# Patient Record
Sex: MALE | Race: WHITE | Employment: OTHER
[De-identification: names, ages, dates, MRNs, and addresses within clinical notes are randomized per-mention and may not be internally consistent; named-entity substitution may affect disease eponyms.]

---

## 2017-01-06 ENCOUNTER — TELEPHONE (OUTPATIENT)
Dept: CASE MANAGEMENT | Age: 57
End: 2017-01-06

## 2018-01-05 ENCOUNTER — TELEPHONE (OUTPATIENT)
Dept: CASE MANAGEMENT | Age: 58
End: 2018-01-05

## 2018-01-05 NOTE — LETTER
Program.   If you have any additional questions or concerns regarding our CT Lung Screening Program or our Incidental Pulmonary Nodule Program, please don't hesitate to call me at [phone]. Pulmonary Nodule Program  [address]:  [phone]  F:  [phone]                                           CT Lung Screening/ Lung Nodule Program  [address] [facility]. [place], [address]       TO:   Moncho Jade MD    FAX:   [phone]        FROM:   [place], [place] Lung Screening Program    DATE:   1/5/2018    PHONE:  [phone]    FAX:    [phone]      Comments:  Annual CT Lung Screening Reminder     RE:  Anisa Erickson  1960    Thank you for your referral to our [address].  If you have any questions please call [phone] or fax [phone]. The information contained in this fax message is intended only for Personal and Confidential use of the designated recipient(s) names above. This information is to be handled as Privileged and Confidential. If the reader of this message is not the intended recipient, you are hereby notified that you have received this document in error, and that any review, dissemination, distribution, or copying of this message is strictly prohibited. If you receive this communication in error, please notify us immediately at the above number and return the original message to us by mail. Thank you. Member of [employer].

## 2019-03-06 ENCOUNTER — OFFICE VISIT (OUTPATIENT)
Dept: FAMILY MEDICINE CLINIC | Age: 59
End: 2019-03-06
Payer: COMMERCIAL

## 2019-03-06 VITALS
WEIGHT: 223 LBS | DIASTOLIC BLOOD PRESSURE: 84 MMHG | SYSTOLIC BLOOD PRESSURE: 126 MMHG | RESPIRATION RATE: 14 BRPM | BODY MASS INDEX: 32.93 KG/M2 | HEART RATE: 90 BPM | OXYGEN SATURATION: 97 %

## 2019-03-06 DIAGNOSIS — M25.511 CHRONIC RIGHT SHOULDER PAIN: Primary | ICD-10-CM

## 2019-03-06 DIAGNOSIS — G89.29 CHRONIC RIGHT SHOULDER PAIN: Primary | ICD-10-CM

## 2019-03-06 PROCEDURE — 20610 DRAIN/INJ JOINT/BURSA W/O US: CPT | Performed by: FAMILY MEDICINE

## 2019-03-06 PROCEDURE — 99213 OFFICE O/P EST LOW 20 MIN: CPT | Performed by: FAMILY MEDICINE

## 2019-03-06 RX ORDER — LIDOCAINE HYDROCHLORIDE 10 MG/ML
3 INJECTION, SOLUTION INFILTRATION; PERINEURAL ONCE
Status: DISCONTINUED | OUTPATIENT
Start: 2019-03-06 | End: 2022-08-15 | Stop reason: CLARIF

## 2019-03-06 RX ORDER — TRIAMCINOLONE ACETONIDE 40 MG/ML
40 INJECTION, SUSPENSION INTRA-ARTICULAR; INTRAMUSCULAR ONCE
Status: COMPLETED | OUTPATIENT
Start: 2019-03-06 | End: 2019-03-06

## 2019-03-06 RX ADMIN — TRIAMCINOLONE ACETONIDE 40 MG: 40 INJECTION, SUSPENSION INTRA-ARTICULAR; INTRAMUSCULAR at 15:52

## 2019-03-06 ASSESSMENT — PATIENT HEALTH QUESTIONNAIRE - PHQ9
SUM OF ALL RESPONSES TO PHQ9 QUESTIONS 1 & 2: 0
1. LITTLE INTEREST OR PLEASURE IN DOING THINGS: 0
2. FEELING DOWN, DEPRESSED OR HOPELESS: 0
SUM OF ALL RESPONSES TO PHQ QUESTIONS 1-9: 0
SUM OF ALL RESPONSES TO PHQ QUESTIONS 1-9: 0

## 2019-03-16 ASSESSMENT — ENCOUNTER SYMPTOMS
CHEST TIGHTNESS: 0
BLOOD IN STOOL: 0
ABDOMINAL PAIN: 0
VOMITING: 0
CONSTIPATION: 0
DIARRHEA: 0
SHORTNESS OF BREATH: 0
COLOR CHANGE: 0
COUGH: 0
WHEEZING: 0

## 2021-03-14 ENCOUNTER — IMMUNIZATION (OUTPATIENT)
Dept: PRIMARY CARE CLINIC | Age: 61
End: 2021-03-14
Payer: MEDICAID

## 2021-03-14 PROCEDURE — 0001A COVID-19, PFIZER VACCINE 30MCG/0.3ML DOSE: CPT | Performed by: NURSE PRACTITIONER

## 2021-03-14 PROCEDURE — 91300 COVID-19, PFIZER VACCINE 30MCG/0.3ML DOSE: CPT | Performed by: NURSE PRACTITIONER

## 2021-04-12 ENCOUNTER — IMMUNIZATION (OUTPATIENT)
Dept: PRIMARY CARE CLINIC | Age: 61
End: 2021-04-12
Payer: MEDICAID

## 2021-04-12 PROCEDURE — 91300 COVID-19, PFIZER VACCINE 30MCG/0.3ML DOSE: CPT | Performed by: NURSE PRACTITIONER

## 2021-04-12 PROCEDURE — 0002A COVID-19, PFIZER VACCINE 30MCG/0.3ML DOSE: CPT | Performed by: NURSE PRACTITIONER

## 2021-08-31 ENCOUNTER — OFFICE VISIT (OUTPATIENT)
Dept: PRIMARY CARE CLINIC | Age: 61
End: 2021-08-31
Payer: MEDICAID

## 2021-08-31 VITALS
BODY MASS INDEX: 31.1 KG/M2 | TEMPERATURE: 97.5 F | SYSTOLIC BLOOD PRESSURE: 132 MMHG | WEIGHT: 210 LBS | OXYGEN SATURATION: 99 % | DIASTOLIC BLOOD PRESSURE: 84 MMHG | HEIGHT: 69 IN | HEART RATE: 71 BPM

## 2021-08-31 DIAGNOSIS — Z00.00 PREVENTATIVE HEALTH CARE: ICD-10-CM

## 2021-08-31 DIAGNOSIS — Z91.89 RISK FOR CORONARY ARTERY DISEASE BETWEEN 10% AND 20% IN NEXT 10 YEARS: ICD-10-CM

## 2021-08-31 DIAGNOSIS — F43.21 ADJUSTMENT DISORDER WITH DEPRESSED MOOD: Primary | ICD-10-CM

## 2021-08-31 DIAGNOSIS — M25.511 CHRONIC PAIN OF BOTH SHOULDERS: ICD-10-CM

## 2021-08-31 DIAGNOSIS — M25.512 CHRONIC PAIN OF BOTH SHOULDERS: ICD-10-CM

## 2021-08-31 DIAGNOSIS — Z12.5 SCREENING FOR MALIGNANT NEOPLASM OF PROSTATE: ICD-10-CM

## 2021-08-31 DIAGNOSIS — G89.29 CHRONIC PAIN OF BOTH SHOULDERS: ICD-10-CM

## 2021-08-31 LAB
ALBUMIN SERPL-MCNC: 4.5 G/DL (ref 3.5–5.2)
ALP BLD-CCNC: 70 U/L (ref 40–129)
ALT SERPL-CCNC: 28 U/L (ref 0–40)
ANION GAP SERPL CALCULATED.3IONS-SCNC: 11 MMOL/L (ref 7–16)
AST SERPL-CCNC: 22 U/L (ref 0–39)
BILIRUB SERPL-MCNC: 0.9 MG/DL (ref 0–1.2)
BUN BLDV-MCNC: 11 MG/DL (ref 6–23)
CALCIUM SERPL-MCNC: 9.2 MG/DL (ref 8.6–10.2)
CHLORIDE BLD-SCNC: 102 MMOL/L (ref 98–107)
CHOLESTEROL, TOTAL: 272 MG/DL (ref 0–199)
CO2: 25 MMOL/L (ref 22–29)
CREAT SERPL-MCNC: 0.8 MG/DL (ref 0.7–1.2)
GFR AFRICAN AMERICAN: >60
GFR NON-AFRICAN AMERICAN: >60 ML/MIN/1.73
GLUCOSE BLD-MCNC: 140 MG/DL (ref 74–99)
HBA1C MFR BLD: 5.6 % (ref 4–5.6)
HCT VFR BLD CALC: 49.1 % (ref 37–54)
HDLC SERPL-MCNC: 40 MG/DL
HEMOGLOBIN: 16.6 G/DL (ref 12.5–16.5)
LDL CHOLESTEROL CALCULATED: 186 MG/DL (ref 0–99)
MCH RBC QN AUTO: 29.3 PG (ref 26–35)
MCHC RBC AUTO-ENTMCNC: 33.8 % (ref 32–34.5)
MCV RBC AUTO: 86.7 FL (ref 80–99.9)
PDW BLD-RTO: 13.1 FL (ref 11.5–15)
PLATELET # BLD: 270 E9/L (ref 130–450)
PMV BLD AUTO: 10.9 FL (ref 7–12)
POTASSIUM SERPL-SCNC: 4.6 MMOL/L (ref 3.5–5)
PROSTATE SPECIFIC ANTIGEN: 4.73 NG/ML (ref 0–4)
RBC # BLD: 5.66 E12/L (ref 3.8–5.8)
SODIUM BLD-SCNC: 138 MMOL/L (ref 132–146)
TOTAL PROTEIN: 6.9 G/DL (ref 6.4–8.3)
TRIGL SERPL-MCNC: 231 MG/DL (ref 0–149)
VLDLC SERPL CALC-MCNC: 46 MG/DL
WBC # BLD: 11 E9/L (ref 4.5–11.5)

## 2021-08-31 PROCEDURE — 4004F PT TOBACCO SCREEN RCVD TLK: CPT | Performed by: FAMILY MEDICINE

## 2021-08-31 PROCEDURE — 99214 OFFICE O/P EST MOD 30 MIN: CPT | Performed by: FAMILY MEDICINE

## 2021-08-31 PROCEDURE — G8427 DOCREV CUR MEDS BY ELIG CLIN: HCPCS | Performed by: FAMILY MEDICINE

## 2021-08-31 PROCEDURE — 20610 DRAIN/INJ JOINT/BURSA W/O US: CPT | Performed by: FAMILY MEDICINE

## 2021-08-31 PROCEDURE — 3017F COLORECTAL CA SCREEN DOC REV: CPT | Performed by: FAMILY MEDICINE

## 2021-08-31 PROCEDURE — G8417 CALC BMI ABV UP PARAM F/U: HCPCS | Performed by: FAMILY MEDICINE

## 2021-08-31 RX ORDER — HYDROCODONE BITARTRATE AND ACETAMINOPHEN 5; 325 MG/1; MG/1
1 TABLET ORAL EVERY 6 HOURS PRN
Qty: 28 TABLET | Refills: 0 | Status: SHIPPED | OUTPATIENT
Start: 2021-08-31 | End: 2021-09-07

## 2021-08-31 RX ORDER — SERTRALINE HYDROCHLORIDE 25 MG/1
25 TABLET, FILM COATED ORAL DAILY
Qty: 30 TABLET | Refills: 1 | Status: SHIPPED
Start: 2021-08-31 | End: 2021-09-16 | Stop reason: SINTOL

## 2021-08-31 RX ORDER — MELOXICAM 15 MG/1
15 TABLET ORAL DAILY
Qty: 30 TABLET | Refills: 3 | Status: SHIPPED
Start: 2021-08-31 | End: 2022-08-15 | Stop reason: CLARIF

## 2021-08-31 RX ORDER — TRIAMCINOLONE ACETONIDE 40 MG/ML
40 INJECTION, SUSPENSION INTRA-ARTICULAR; INTRAMUSCULAR ONCE
Status: COMPLETED | OUTPATIENT
Start: 2021-08-31 | End: 2021-08-31

## 2021-08-31 RX ADMIN — TRIAMCINOLONE ACETONIDE 40 MG: 40 INJECTION, SUSPENSION INTRA-ARTICULAR; INTRAMUSCULAR at 10:40

## 2021-08-31 RX ADMIN — TRIAMCINOLONE ACETONIDE 40 MG: 40 INJECTION, SUSPENSION INTRA-ARTICULAR; INTRAMUSCULAR at 10:39

## 2021-08-31 ASSESSMENT — PATIENT HEALTH QUESTIONNAIRE - PHQ9
5. POOR APPETITE OR OVEREATING: 3
SUM OF ALL RESPONSES TO PHQ9 QUESTIONS 1 & 2: 6
3. TROUBLE FALLING OR STAYING ASLEEP: 3
SUM OF ALL RESPONSES TO PHQ QUESTIONS 1-9: 24
SUM OF ALL RESPONSES TO PHQ QUESTIONS 1-9: 27
2. FEELING DOWN, DEPRESSED OR HOPELESS: 3
10. IF YOU CHECKED OFF ANY PROBLEMS, HOW DIFFICULT HAVE THESE PROBLEMS MADE IT FOR YOU TO DO YOUR WORK, TAKE CARE OF THINGS AT HOME, OR GET ALONG WITH OTHER PEOPLE: 2
9. THOUGHTS THAT YOU WOULD BE BETTER OFF DEAD, OR OF HURTING YOURSELF: 3
6. FEELING BAD ABOUT YOURSELF - OR THAT YOU ARE A FAILURE OR HAVE LET YOURSELF OR YOUR FAMILY DOWN: 3
SUM OF ALL RESPONSES TO PHQ QUESTIONS 1-9: 27
7. TROUBLE CONCENTRATING ON THINGS, SUCH AS READING THE NEWSPAPER OR WATCHING TELEVISION: 3
4. FEELING TIRED OR HAVING LITTLE ENERGY: 3
8. MOVING OR SPEAKING SO SLOWLY THAT OTHER PEOPLE COULD HAVE NOTICED. OR THE OPPOSITE, BEING SO FIGETY OR RESTLESS THAT YOU HAVE BEEN MOVING AROUND A LOT MORE THAN USUAL: 3
1. LITTLE INTEREST OR PLEASURE IN DOING THINGS: 3

## 2021-08-31 ASSESSMENT — ENCOUNTER SYMPTOMS
DIARRHEA: 0
CHEST TIGHTNESS: 0
COUGH: 0
SHORTNESS OF BREATH: 0
ABDOMINAL PAIN: 0
CONSTIPATION: 0
VOMITING: 0
WHEEZING: 0
BLOOD IN STOOL: 0
COLOR CHANGE: 0

## 2021-08-31 ASSESSMENT — COLUMBIA-SUICIDE SEVERITY RATING SCALE - C-SSRS
7. DID THIS OCCUR IN THE LAST THREE MONTHS: YES
4. HAVE YOU HAD THESE THOUGHTS AND HAD SOME INTENTION OF ACTING ON THEM?: NO
3. HAVE YOU BEEN THINKING ABOUT HOW YOU MIGHT KILL YOURSELF?: YES
6. HAVE YOU EVER DONE ANYTHING, STARTED TO DO ANYTHING, OR PREPARED TO DO ANYTHING TO END YOUR LIFE?: NO
5. HAVE YOU STARTED TO WORK OUT OR WORKED OUT THE DETAILS OF HOW TO KILL YOURSELF? DO YOU INTEND TO CARRY OUT THIS PLAN?: NO
2. HAVE YOU ACTUALLY HAD ANY THOUGHTS OF KILLING YOURSELF?: YES
1. WITHIN THE PAST MONTH, HAVE YOU WISHED YOU WERE DEAD OR WISHED YOU COULD GO TO SLEEP AND NOT WAKE UP?: YES

## 2021-08-31 NOTE — PROGRESS NOTES
Bret Jarvis  : 1960    Chief Complaint:     Chief Complaint   Patient presents with    Shoulder Pain     west shoulder/joint pain, has had issues for years but fell off roof a few weeks ago    Tailbone Pain     from fall, went to ED in [place]     HPI  Has chronic shoulder pain bilaterally from previous rotator tears. He has completed PT and saw Dr Alannah Hanks for this already, who states the damage is too significant and surgery will be difficult. He's recommended steroid injections for the pain for as long as it will hold him. Last injection was  which lasted for about 1 year for pain, has started to come back and flared significantly from recent fall. Was working on roof/gutters about 3 weeks ago and slipped and fell forward onto his outstretched arms and suzan his shoulders, then slid off onto his tailbone. Went to ED in [place], records received and scanned. Xray showing degenerative cysts disease and chronic rotator damage, but no acute disease. He was discharged with naproxen which is not helping. Also suffering with acute adjustment reaction with primarily depression from his Wife's passing in  from cancer. Acutely depressed, tearful with anhedonia. He is seeing a grief counselor and going to group meetings. Denies suicidal ideation. He is caring for her 2 cats now which likely help. Agreeable to medication as well.      Past medical, surgical, family and social histories reviewed and updated today as appropriate    Health Maintenance Due   Topic Date Due    Hepatitis C screen  Never done    Pneumococcal 0-64 years Vaccine (1 of 2 - PPSV23) Never done    HIV screen  Never done    DTaP/Tdap/Td vaccine (1 - Tdap) Never done    Diabetes screen  Never done    Colon cancer screen colonoscopy  Never done    Shingles Vaccine (1 of 2) Never done    Low dose CT lung screening  2018       Current Outpatient Medications   Medication Sig Dispense Refill    meloxicam (MOBIC) 15 MG tablet Take 1 tablet by mouth daily 30 tablet 3    sertraline (ZOLOFT) 25 MG tablet Take 1 tablet by mouth daily 30 tablet 1    HYDROcodone-acetaminophen (NORCO) 5-325 MG per tablet Take 1 tablet by mouth every 6 hours as needed for Pain for up to 7 days. Take lowest dose possible to manage pain 28 tablet 0    vitamin B-12 (CYANOCOBALAMIN) 100 MCG tablet Take 50 mcg by mouth daily      Cholecalciferol (VITAMIN D3) 1000 UNITS TABS Take 2 tablets by mouth daily 60 tablet 3    aspirin 81 MG tablet Take 81 mg by mouth daily (Patient not taking: Reported on 8/31/2021)       Current Facility-Administered Medications   Medication Dose Route Frequency Provider Last Rate Last Admin    lidocaine 1 % injection 3 mL  3 mL Intra-articular Once Najma Faith MD           No Known Allergies      REVIEW OF SYSTEMS  Review of Systems   Constitutional: Positive for activity change, appetite change and fatigue. Negative for chills, diaphoresis, fever and unexpected weight change. Respiratory: Negative for cough, chest tightness, shortness of breath and wheezing. Cardiovascular: Negative for chest pain, palpitations and leg swelling. Gastrointestinal: Negative for abdominal pain, blood in stool, constipation, diarrhea and vomiting. Endocrine: Negative for cold intolerance, heat intolerance, polydipsia, polyphagia and polyuria. Musculoskeletal: Positive for arthralgias. Negative for joint swelling, myalgias, neck pain and neck stiffness. Skin: Negative for color change, pallor and rash. Neurological: Positive for weakness. Negative for dizziness, syncope, numbness and headaches. Psychiatric/Behavioral: Positive for dysphoric mood and sleep disturbance. Negative for agitation, behavioral problems, confusion, decreased concentration, hallucinations, self-injury and suicidal ideas. The patient is not nervous/anxious and is not hyperactive. All other systems reviewed and are negative.       PHYSICAL EXAM  /84   Pulse 71   Temp 97.5 °F (36.4 °C)   Ht 5' 9\" (1.753 m)   Wt 210 lb (95.3 kg)   SpO2 99%   BMI 31.01 kg/m²   Physical Exam  Vitals and nursing note reviewed. Constitutional:       General: He is not in acute distress. Appearance: Normal appearance. He is not ill-appearing or diaphoretic. Cardiovascular:      Rate and Rhythm: Normal rate and regular rhythm. Heart sounds: Normal heart sounds. No murmur heard. No friction rub. No gallop. Pulmonary:      Effort: Pulmonary effort is normal. No respiratory distress. Breath sounds: Normal breath sounds. No stridor. No wheezing, rhonchi or rales. Musculoskeletal:      Right shoulder: No swelling, deformity, effusion, tenderness, bony tenderness or crepitus. Decreased range of motion (limited abduction and internal rotation). Left shoulder: No swelling, deformity, effusion, tenderness, bony tenderness or crepitus. Decreased range of motion (same). Cervical back: Normal range of motion. No tenderness. Skin:     General: Skin is warm and dry. Neurological:      Mental Status: He is alert. Psychiatric:         Attention and Perception: Attention and perception normal.         Mood and Affect: Mood is depressed. Affect is tearful. Speech: Speech normal.         Behavior: Behavior normal. Behavior is cooperative. Thought Content: Thought content normal. Thought content is not paranoid or delusional. Thought content does not include homicidal or suicidal ideation. Thought content does not include homicidal or suicidal plan. ASSESSMENT/PLAN:    1. Adjustment disorder with depressed mood  -     sertraline (ZOLOFT) 25 MG tablet; Take 1 tablet by mouth daily, Disp-30 tablet, R-1Normal  2. Risk for coronary artery disease between 10% and 20% in next 10 years  3. Preventative health care  -     CBC; Future  -     Comprehensive Metabolic Panel; Future  -     Hemoglobin A1C; Future  -     Lipid Panel;  Future  -     PSA screening; Future  4. Screening for malignant neoplasm of prostate  -     PSA screening; Future  5. Chronic pain of both shoulders  -     HYDROcodone-acetaminophen (NORCO) 5-325 MG per tablet; Take 1 tablet by mouth every 6 hours as needed for Pain for up to 7 days. Take lowest dose possible to manage pain, Disp-28 tablet, R-0Normal  -     triamcinolone acetonide (KENALOG-40) injection 40 mg; 40 mg, Intra-articular, ONCE, On Tue 8/31/21 at 1100, For 1 dose  -     triamcinolone acetonide (KENALOG-40) injection 40 mg; 40 mg, Intra-articular, ONCE, On Tue 8/31/21 at 1100, For 1 dose  -     [address] - [place] DRAIN/INJECT LARGE JOINT/BURSA    Encouraged to continue therapy. Add trial of Zoloft, reviewed possible s/e and to call with any significant. Will review in 3-4 weeks. Labs, he is due, and want to check renal function with NSAID script. Naproxen did not help so we'll try meloxicam. Shoulder injections bilaterally per his request, procedure notes below. Can give 1 week of Norco, OARRS OK, see how he does with pain after injections and can refer to Pain Mgmt if needed for further medication. 6+ weeks of pain to be expected with bruised coccyx. Rec donut pillow, NSAID. Shoulder injections: The patient was counseled on the options for treating the affected shoulders. These include but were not limited to trail of oral anti-inflammatories, oral steroids, physical therapy referral, or ortho consultation. The patient is electing injection. He has already seen Ortho and told he was non-surgical due to the amount of damage in his shoulders. Was told to use steroid injections until they fail. The risks of the injection were explained, including but not limited to infection, bleeding, bruising, tendon injury, skin contour deformity and soft tissue/fat necrosis. The patient gave verbal permission to proceed. The patient was placed in a seated position.   The skin of the Right and Left shoulders were prepped with alcohol swabs x2. Utilizing a posterior approach, an injection of 40 mg of Kenalog and 2 cc of 1% lidocaine without epinephrine were injected into first the left shoulder followed by the right shoulder- after first aspirating to assure no blood return. The injection site was then wiped again with alcohol and covered with a band aid. The procedure was tolerated well. Routine wound instructions given verbally to the patient. Advised to notify if bleeding, excessive bruising, or swelling develops.  __________________    Reviewed age and gender appropriate health screening exams and vaccinations. Advised patient regarding importance of keeping up with recommended health maintenance and to schedule as soon as possible if overdue, as this is important in assessing for undiagnosed pathology, especially cancer. Patient verbalizes understanding and agrees. Call or go to ED immediately if symptoms worsen or persist.  Return in about 4 weeks (around 9/28/2021). Sooner if necessary. Counseled regarding above diagnosis, including possible risks and complications,especially if left uncontrolled. Counseled regarding the possible side effects, risks, benefits and alternatives to treatment; patient and/or guardian verbalizes understanding. Advised patient to call with any newmedication issues. All questions answered.     Valerie Quinones MD  8/31/21

## 2021-09-14 ENCOUNTER — TELEPHONE (OUTPATIENT)
Dept: PRIMARY CARE CLINIC | Age: 61
End: 2021-09-14

## 2021-09-16 RX ORDER — ROSUVASTATIN CALCIUM 10 MG/1
10 TABLET, COATED ORAL DAILY
Qty: 30 TABLET | Refills: 3 | Status: SHIPPED
Start: 2021-09-16 | End: 2022-08-10 | Stop reason: SDUPTHER

## 2021-09-16 RX ORDER — ROSUVASTATIN CALCIUM 10 MG/1
10 TABLET, FILM COATED ORAL DAILY
Qty: 30 TABLET | Refills: 3 | Status: SHIPPED
Start: 2021-09-16 | End: 2021-09-16 | Stop reason: SDUPTHER

## 2021-09-21 ENCOUNTER — NURSE ONLY (OUTPATIENT)
Dept: PRIMARY CARE CLINIC | Age: 61
End: 2021-09-21

## 2021-09-21 DIAGNOSIS — Z12.5 SCREENING FOR MALIGNANT NEOPLASM OF PROSTATE: Primary | ICD-10-CM

## 2021-09-22 ENCOUNTER — TELEPHONE (OUTPATIENT)
Dept: PRIMARY CARE CLINIC | Age: 61
End: 2021-09-22

## 2021-09-22 DIAGNOSIS — R97.20 ELEVATED PSA: Primary | ICD-10-CM

## 2022-07-22 ENCOUNTER — OFFICE VISIT (OUTPATIENT)
Dept: PRIMARY CARE CLINIC | Age: 62
End: 2022-07-22
Payer: MEDICAID

## 2022-07-22 VITALS
OXYGEN SATURATION: 99 % | SYSTOLIC BLOOD PRESSURE: 124 MMHG | HEIGHT: 69 IN | WEIGHT: 221.8 LBS | DIASTOLIC BLOOD PRESSURE: 82 MMHG | TEMPERATURE: 97.4 F | HEART RATE: 75 BPM | RESPIRATION RATE: 18 BRPM | BODY MASS INDEX: 32.85 KG/M2

## 2022-07-22 DIAGNOSIS — F32.1 CURRENT MODERATE EPISODE OF MAJOR DEPRESSIVE DISORDER WITHOUT PRIOR EPISODE (HCC): ICD-10-CM

## 2022-07-22 DIAGNOSIS — R06.09 DOE (DYSPNEA ON EXERTION): ICD-10-CM

## 2022-07-22 DIAGNOSIS — Z72.0 TOBACCO USE: ICD-10-CM

## 2022-07-22 DIAGNOSIS — Z91.89 RISK FOR CORONARY ARTERY DISEASE GREATER THAN 20% IN NEXT 10 YEARS: ICD-10-CM

## 2022-07-22 DIAGNOSIS — R29.818 SUSPECTED SLEEP APNEA: ICD-10-CM

## 2022-07-22 DIAGNOSIS — F41.9 ANXIETY: ICD-10-CM

## 2022-07-22 DIAGNOSIS — R06.02 SOB (SHORTNESS OF BREATH): Primary | ICD-10-CM

## 2022-07-22 DIAGNOSIS — R79.89 LOW TESTOSTERONE: ICD-10-CM

## 2022-07-22 DIAGNOSIS — F11.20 OPIOID DEPENDENCE WITH CURRENT USE (HCC): ICD-10-CM

## 2022-07-22 DIAGNOSIS — R06.01 ORTHOPNEA: ICD-10-CM

## 2022-07-22 PROCEDURE — 4004F PT TOBACCO SCREEN RCVD TLK: CPT | Performed by: FAMILY MEDICINE

## 2022-07-22 PROCEDURE — 3017F COLORECTAL CA SCREEN DOC REV: CPT | Performed by: FAMILY MEDICINE

## 2022-07-22 PROCEDURE — G8427 DOCREV CUR MEDS BY ELIG CLIN: HCPCS | Performed by: FAMILY MEDICINE

## 2022-07-22 PROCEDURE — G8417 CALC BMI ABV UP PARAM F/U: HCPCS | Performed by: FAMILY MEDICINE

## 2022-07-22 PROCEDURE — 99215 OFFICE O/P EST HI 40 MIN: CPT | Performed by: FAMILY MEDICINE

## 2022-07-22 PROCEDURE — 93000 ELECTROCARDIOGRAM COMPLETE: CPT | Performed by: FAMILY MEDICINE

## 2022-07-22 ASSESSMENT — ENCOUNTER SYMPTOMS
CHEST TIGHTNESS: 1
NAUSEA: 0
DIARRHEA: 0
CONSTIPATION: 0
SHORTNESS OF BREATH: 1
VOMITING: 0
WHEEZING: 0
COUGH: 0
BLOOD IN STOOL: 0
ABDOMINAL DISTENTION: 1
ABDOMINAL PAIN: 0

## 2022-07-22 NOTE — PROGRESS NOTES
Dylon Carrillo  : 1960    Chief Complaint:     Chief Complaint   Patient presents with    Shortness of Breath     Few months, last night worse     HPI  SOB at rest, NATH, chest tightness, orthopnea, nighttime gasping for several months. Worse in last week. Very anxious. Feeling of fullness in chest or tightness at times. Has been depressed since wife passed. Over eating and eating a lot of junk. Has gained 11 pounds. Denies chest pain. No dizziness, lightheadedness, or palps. No LE edema. Denies wheeze or cough or sputum production. No identifiable respiratory triggers. Symptoms of possible PERLA. The patient does have loud snoring. He does have a short sleep latency. He  does not have increased urination at night. The patient complains of of daytime sleepiness, complains of morning headaches, denies nocturnal diaphoresis, complains of snorting or gasping at night, is not refreshed in the morning. Other people have noted apnea. There is  a history of alcohol or recreational drug use. There is  a history of taking prescription pain medications. This has been going on for months. Eppworth Sleepiness Scale score is 9  Neck circumference is 17\"    Did not have follow up from previous labs with testosterone level 90. Fatigue, depression, low libido, irritability.     Past medical, surgical, family and social histories reviewed and updated today as appropriate    Health Maintenance Due   Topic Date Due    Pneumococcal 0-64 years Vaccine (1 - PCV) Never done    HIV screen  Never done    Hepatitis C screen  Never done    DTaP/Tdap/Td vaccine (1 - Tdap) Never done    Colorectal Cancer Screen  Never done    Shingles vaccine (1 of 2) Never done    Low dose CT lung screening  2018    COVID-19 Vaccine (4 - Booster for Pfizer series) 2022       Current Outpatient Medications   Medication Sig Dispense Refill    rosuvastatin (CRESTOR) 10 MG tablet Take 1 tablet by mouth daily (Patient not taking: Reported on 7/22/2022) 30 tablet 3    meloxicam (MOBIC) 15 MG tablet Take 1 tablet by mouth daily (Patient not taking: Reported on 7/22/2022) 30 tablet 3    aspirin 81 MG tablet Take 81 mg by mouth daily (Patient not taking: No sig reported)      vitamin B-12 (CYANOCOBALAMIN) 100 MCG tablet Take 50 mcg by mouth daily (Patient not taking: Reported on 7/22/2022)      Cholecalciferol (VITAMIN D3) 1000 UNITS TABS Take 2 tablets by mouth daily (Patient not taking: Reported on 7/22/2022) 60 tablet 3     Current Facility-Administered Medications   Medication Dose Route Frequency Provider Last Rate Last Admin    lidocaine 1 % injection 3 mL  3 mL Intra-artICUlar Once Jena Calhoun MD           No Known Allergies      REVIEW OF SYSTEMS  Review of Systems   Constitutional:  Positive for activity change, appetite change, fatigue and unexpected weight change. Negative for chills, diaphoresis and fever. HENT: Negative. Respiratory:  Positive for chest tightness and shortness of breath. Negative for cough and wheezing. Cardiovascular:  Negative for chest pain, palpitations and leg swelling. Gastrointestinal:  Positive for abdominal distention. Negative for abdominal pain, blood in stool, constipation, diarrhea, nausea and vomiting. Endocrine: Negative for cold intolerance, heat intolerance, polydipsia, polyphagia and polyuria. Skin: Negative. Neurological:  Negative for dizziness, syncope, weakness, light-headedness, numbness and headaches. Psychiatric/Behavioral:  Positive for decreased concentration, dysphoric mood and sleep disturbance. Negative for behavioral problems, confusion, hallucinations and self-injury. The patient is nervous/anxious. The patient is not hyperactive.         PHYSICAL EXAM  /82   Pulse 75   Temp 97.4 °F (36.3 °C)   Resp 18   Ht 5' 9\" (1.753 m)   Wt 221 lb 12.8 oz (100.6 kg)   SpO2 99%   BMI 32.75 kg/m²   Physical Exam  Constitutional:       General: He is not in acute distress. Appearance: Normal appearance. He is not ill-appearing, toxic-appearing or diaphoretic. HENT:      Nose: Nose normal. No congestion or rhinorrhea. Mouth/Throat:      Mouth: Mucous membranes are moist.      Pharynx: Oropharynx is clear. No oropharyngeal exudate or posterior oropharyngeal erythema. Eyes:      General: No scleral icterus. Extraocular Movements: Extraocular movements intact. Conjunctiva/sclera: Conjunctivae normal.      Pupils: Pupils are equal, round, and reactive to light. Cardiovascular:      Rate and Rhythm: Normal rate and regular rhythm. Heart sounds: Murmur (2/6 HAILE) heard. No friction rub. No gallop. Pulmonary:      Effort: Pulmonary effort is normal. No respiratory distress. Breath sounds: Normal breath sounds. No stridor. No wheezing, rhonchi or rales. Abdominal:      General: Abdomen is flat. There is no distension. Palpations: Abdomen is soft. Tenderness: There is no abdominal tenderness. There is no guarding or rebound. Musculoskeletal:      Cervical back: Neck supple. No tenderness. Right lower leg: No edema. Left lower leg: No edema. Lymphadenopathy:      Cervical: No cervical adenopathy. Skin:     General: Skin is warm and dry. Findings: No rash. Neurological:      General: No focal deficit present. Mental Status: He is alert. Psychiatric:         Attention and Perception: He is attentive. He does not perceive auditory or visual hallucinations. Mood and Affect: Mood is anxious and depressed. Affect is tearful. Speech: Speech normal.         Behavior: Behavior normal. Behavior is cooperative. Thought Content: Thought content normal.         Judgment: Judgment is inappropriate.      EKG NSR, normal     The 10-year ASCVD risk score (Sarthak Henderson, et al., 2013) is: 20.3%    Values used to calculate the score:      Age: 64 years      Sex: Male      Is Non-  American: No      Diabetic: No      Tobacco smoker: Yes      Systolic Blood Pressure: 431 mmHg      Is BP treated: No      HDL Cholesterol: 40 mg/dL      Total Cholesterol: 272 mg/dL    ASSESSMENT/PLAN:    1. SOB (shortness of breath)  -     EKG 12 Lead  -     CBC; Future  -     Comprehensive Metabolic Panel; Future  -     Hemoglobin A1C; Future  -     Lipid Panel; Future  -     Brain Natriuretic Peptide; Future  -     XR CHEST (2 VW); Future  -     perflutren lipid microspheres (DEFINITY) injection 1.65 mg; 1.65 mg (1.5 mL), IntraVENous, IMG ONCE PRN, 1 dose, Starting on Fri 7/22/22 at 1331, Until Discontinued, Other, Inability to detect 2 or more contiguous segments in any of the 3 apical views due to poor endocardial border definitionEchocardiogram should first be performed without contrast and if exam is adequate then DO NOT administer the contrast and delete the order using Per Protocol order mode. If unable to detect 2 or more contiguous segments in any of the 3 apical views due to poor endocardial border definition, then assess patient for any contraindications to echo contrast and if none present administer the echo contrast.Pre-procedure(Stress)  -     ECHO COMPLETE; Future  -     NM Cardiac Stress Test Nuclear Imaging; Future  -     Cardiac Stress Test Exercise- Treadmill; Future  -     Full PFT Study With Bronchodilator; Future  2. Orthopnea  -     EKG 12 Lead  -     perflutren lipid microspheres (DEFINITY) injection 1.65 mg; 1.65 mg (1.5 mL), IntraVENous, IMG ONCE PRN, 1 dose, Starting on Fri 7/22/22 at 1331, Until Discontinued, Other, Inability to detect 2 or more contiguous segments in any of the 3 apical views due to poor endocardial border definitionEchocardiogram should first be performed without contrast and if exam is adequate then DO NOT administer the contrast and delete the order using Per Protocol order mode.   If unable to detect 2 or more contiguous segments in any of the 3 apical views due to poor endocardial border definition, then assess patient for any contraindications to echo contrast and if none present administer the echo contrast.Pre-procedure(Stress)  -     ECHO COMPLETE; Future  -     NM Cardiac Stress Test Nuclear Imaging; Future  -     Cardiac Stress Test Exercise- Treadmill; Future  3. Low testosterone  -     Testosterone, free, total; Future  -     Follicle Stimulating Hormone; Future  -     Prolactin; Future  -     Luteinizing Hormone; Future  -     Cortisol Total; Future  -     TSH; Future  4. Suspected sleep apnea  -     Baseline Diagnostic Sleep Study; Future  5. NATH (dyspnea on exertion)  -     NM Cardiac Stress Test Nuclear Imaging; Future  -     Cardiac Stress Test Exercise- Treadmill; Future  6. Tobacco use  -     Full PFT Study With Bronchodilator; Future  7. Risk for coronary artery disease greater than 20% in next 10 years  -     perflutren lipid microspheres (DEFINITY) injection 1.65 mg; 1.65 mg (1.5 mL), IntraVENous, IMG ONCE PRN, 1 dose, Starting on Fri 7/22/22 at 1331, Until Discontinued, Other, Inability to detect 2 or more contiguous segments in any of the 3 apical views due to poor endocardial border definitionEchocardiogram should first be performed without contrast and if exam is adequate then DO NOT administer the contrast and delete the order using Per Protocol order mode. If unable to detect 2 or more contiguous segments in any of the 3 apical views due to poor endocardial border definition, then assess patient for any contraindications to echo contrast and if none present administer the echo contrast.Pre-procedure(Stress)  -     ECHO COMPLETE; Future  -     NM Cardiac Stress Test Nuclear Imaging; Future  -     Cardiac Stress Test Exercise- Treadmill; Future  -     Full PFT Study With Bronchodilator; Future  8. Current moderate episode of major depressive disorder without prior episode ([address]  9. Anxiety  10.  Opioid dependence with current use ([address]    Greater than 40 minutes were spent face-to-face with the patient and charting/ordering during this encounter and over half of that time was spent on counseling and review of chronic diagnoses, review of differential, review of needed testing, discussing options for opioid use disorder including MAT here with suboxone and establishing with counselor. I also educated the patient about lifestyle modifications which may improve the above problem. Reviewed age and gender appropriate health screening exams and vaccinations. Advised patient regarding importance of keeping up with recommended health maintenance and to schedule as soon as possible if overdue, as this is important in assessing for undiagnosed pathology, especially cancer. Patient verbalizes understanding and agrees. Call or go to ED immediately if symptoms worsen or persist.  Return in about 4 weeks (around 8/19/2022). Sooner if necessary. Counseled regarding above diagnosis(es), including possible risks and complications, especially if left uncontrolled. Counseled regarding the possible side effects, risks, benefits and alternatives to treatment; patient and/or guardian verbalizes understanding. Advised patient to call with any new medication issues. All questions answered.     Bryanna Rucker MD  7/22/22

## 2022-07-23 ENCOUNTER — HOSPITAL ENCOUNTER (OUTPATIENT)
Age: 62
Discharge: HOME | End: 2022-07-23
Payer: MEDICAID

## 2022-07-23 ENCOUNTER — HOSPITAL ENCOUNTER (OUTPATIENT)
Age: 62
Discharge: HOME | End: 2022-07-25
Payer: MEDICAID

## 2022-07-23 ENCOUNTER — HOSPITAL ENCOUNTER (OUTPATIENT)
Dept: GENERAL RADIOLOGY | Age: 62
Discharge: HOME | End: 2022-07-25
Payer: MEDICAID

## 2022-07-23 DIAGNOSIS — R06.02 SOB (SHORTNESS OF BREATH): ICD-10-CM

## 2022-07-23 DIAGNOSIS — R79.89 LOW TESTOSTERONE: ICD-10-CM

## 2022-07-23 LAB
ALBUMIN SERPL-MCNC: 4.2 G/DL (ref 3.5–5.2)
ALP BLD-CCNC: 74 U/L (ref 40–129)
ALT SERPL-CCNC: 21 U/L (ref 0–40)
ANION GAP SERPL CALCULATED.3IONS-SCNC: 12 MMOL/L (ref 7–16)
AST SERPL-CCNC: 13 U/L (ref 0–39)
BILIRUB SERPL-MCNC: 0.3 MG/DL (ref 0–1.2)
BUN BLDV-MCNC: 9 MG/DL (ref 6–23)
CALCIUM SERPL-MCNC: 9.5 MG/DL (ref 8.6–10.2)
CHLORIDE BLD-SCNC: 103 MMOL/L (ref 98–107)
CHOLESTEROL, TOTAL: 246 MG/DL (ref 0–199)
CO2: 25 MMOL/L (ref 22–29)
CORTISOL TOTAL: 5.2 MCG/DL (ref 2.68–18.4)
CREAT SERPL-MCNC: 0.9 MG/DL (ref 0.7–1.2)
FOLLICLE STIMULATING HORMONE: 8.1 MIU/ML
GFR AFRICAN AMERICAN: >60
GFR NON-AFRICAN AMERICAN: >60 ML/MIN/1.73
GLUCOSE BLD-MCNC: 106 MG/DL (ref 74–99)
HBA1C MFR BLD: 5.6 % (ref 4–5.6)
HCT VFR BLD CALC: 44.8 % (ref 37–54)
HDLC SERPL-MCNC: 39 MG/DL
HEMOGLOBIN: 15.3 G/DL (ref 12.5–16.5)
LDL CHOLESTEROL CALCULATED: 171 MG/DL (ref 0–99)
MCH RBC QN AUTO: 30.1 PG (ref 26–35)
MCHC RBC AUTO-ENTMCNC: 34.2 % (ref 32–34.5)
MCV RBC AUTO: 88 FL (ref 80–99.9)
PDW BLD-RTO: 12.6 FL (ref 11.5–15)
PLATELET # BLD: 255 E9/L (ref 130–450)
PMV BLD AUTO: 10 FL (ref 7–12)
POTASSIUM SERPL-SCNC: 4.1 MMOL/L (ref 3.5–5)
PRO-BNP: 78 PG/ML (ref 0–125)
PROLACTIN: 19.94 NG/ML
RBC # BLD: 5.09 E12/L (ref 3.8–5.8)
SODIUM BLD-SCNC: 140 MMOL/L (ref 132–146)
TOTAL PROTEIN: 7.2 G/DL (ref 6.4–8.3)
TRIGL SERPL-MCNC: 180 MG/DL (ref 0–149)
TSH SERPL DL<=0.05 MIU/L-ACNC: 3.07 UIU/ML (ref 0.27–4.2)
VLDLC SERPL CALC-MCNC: 36 MG/DL
WBC # BLD: 9.9 E9/L (ref 4.5–11.5)

## 2022-07-23 PROCEDURE — 36415 COLL VENOUS BLD VENIPUNCTURE: CPT

## 2022-07-23 PROCEDURE — 83001 ASSAY OF GONADOTROPIN (FSH): CPT

## 2022-07-23 PROCEDURE — 84146 ASSAY OF PROLACTIN: CPT

## 2022-07-23 PROCEDURE — 83036 HEMOGLOBIN GLYCOSYLATED A1C: CPT

## 2022-07-23 PROCEDURE — 80053 COMPREHEN METABOLIC PANEL: CPT

## 2022-07-23 PROCEDURE — 85027 COMPLETE CBC AUTOMATED: CPT

## 2022-07-23 PROCEDURE — 84443 ASSAY THYROID STIM HORMONE: CPT

## 2022-07-23 PROCEDURE — 83880 ASSAY OF NATRIURETIC PEPTIDE: CPT

## 2022-07-23 PROCEDURE — 82533 TOTAL CORTISOL: CPT

## 2022-07-23 PROCEDURE — 80061 LIPID PANEL: CPT

## 2022-07-23 PROCEDURE — 84403 ASSAY OF TOTAL TESTOSTERONE: CPT

## 2022-07-23 PROCEDURE — 83002 ASSAY OF GONADOTROPIN (LH): CPT

## 2022-07-23 PROCEDURE — 71046 X-RAY EXAM CHEST 2 VIEWS: CPT

## 2022-07-23 PROCEDURE — 84270 ASSAY OF SEX HORMONE GLOBUL: CPT

## 2022-07-24 LAB — LUTEINIZING HORMONE: 2.9 MIU/ML

## 2022-07-28 LAB
SEX HORMONE BINDING GLOBULIN: 42 NMOL/L (ref 11–80)
TESTOSTERONE FREE-NONMALE: 39.2 PG/ML (ref 47–244)
TESTOSTERONE TOTAL: 237 NG/DL (ref 220–1000)

## 2022-08-02 DIAGNOSIS — R79.89 LOW TESTOSTERONE: Primary | ICD-10-CM

## 2022-08-04 ENCOUNTER — HOSPITAL ENCOUNTER (OUTPATIENT)
Dept: PULMONOLOGY | Age: 62
Discharge: HOME | End: 2022-08-04
Payer: MEDICAID

## 2022-08-04 ENCOUNTER — TELEPHONE (OUTPATIENT)
Dept: ADMINISTRATIVE | Age: 62
End: 2022-08-04

## 2022-08-04 DIAGNOSIS — R06.02 SOB (SHORTNESS OF BREATH): ICD-10-CM

## 2022-08-04 DIAGNOSIS — Z72.0 TOBACCO USE: ICD-10-CM

## 2022-08-04 DIAGNOSIS — Z91.89 RISK FOR CORONARY ARTERY DISEASE GREATER THAN 20% IN NEXT 10 YEARS: ICD-10-CM

## 2022-08-04 PROCEDURE — 94729 DIFFUSING CAPACITY: CPT

## 2022-08-04 PROCEDURE — 94060 EVALUATION OF WHEEZING: CPT

## 2022-08-04 PROCEDURE — 94726 PLETHYSMOGRAPHY LUNG VOLUMES: CPT

## 2022-08-04 NOTE — TELEPHONE ENCOUNTER
Patient Appointment Form:      PCP: Luis Leigh  Referring: pcp    Has the Patient:    Seen a Cardiologist? no    Had a heart catheterization? no    Had heart surgery? no    Had a stress test or nuclear stress test? no    Had an echocardiogram? no    Had a vascular ultrasound? no    Had a 24/48 heart monitor or extended cardiac event monitor? no    Had recent blood work in the last 6 months? yes    date: 7/23/22    ordering physician: Luis Leigh    Had a pacemaker/ICD/ILR implant? no    Seen an Electrophysiologist? no        Will send records via: in [address] [place] [address]      Date & time of appointment:  romelia Leo 8/15/22 7:30  Np cardiac history

## 2022-08-05 ENCOUNTER — TELEPHONE (OUTPATIENT)
Dept: NON INVASIVE DIAGNOSTICS | Age: 62
End: 2022-08-05

## 2022-08-08 ENCOUNTER — HOSPITAL ENCOUNTER (OUTPATIENT)
Dept: NON INVASIVE DIAGNOSTICS | Age: 62
Discharge: HOME | End: 2022-08-08
Payer: MEDICAID

## 2022-08-08 ENCOUNTER — HOSPITAL ENCOUNTER (OUTPATIENT)
Dept: NUCLEAR MEDICINE | Age: 62
Discharge: HOME | End: 2022-08-10
Payer: MEDICAID

## 2022-08-08 DIAGNOSIS — Z91.89 RISK FOR CORONARY ARTERY DISEASE GREATER THAN 20% IN NEXT 10 YEARS: ICD-10-CM

## 2022-08-08 DIAGNOSIS — R06.02 SHORTNESS OF BREATH: ICD-10-CM

## 2022-08-08 DIAGNOSIS — R06.09 DOE (DYSPNEA ON EXERTION): ICD-10-CM

## 2022-08-08 DIAGNOSIS — R06.01 ORTHOPNEA: ICD-10-CM

## 2022-08-08 DIAGNOSIS — R06.02 SOB (SHORTNESS OF BREATH): ICD-10-CM

## 2022-08-08 LAB
LV EF: 65 %
LV EF: 78 %
LVEF MODALITY: NORMAL
LVEF MODALITY: NORMAL

## 2022-08-08 PROCEDURE — 78452 HT MUSCLE IMAGE SPECT MULT: CPT

## 2022-08-08 PROCEDURE — 93016 CV STRESS TEST SUPVJ ONLY: CPT | Performed by: INTERNAL MEDICINE

## 2022-08-08 PROCEDURE — 93306 TTE W/DOPPLER COMPLETE: CPT

## 2022-08-08 PROCEDURE — A9500 TC99M SESTAMIBI: HCPCS | Performed by: RADIOLOGY

## 2022-08-08 PROCEDURE — 93018 CV STRESS TEST I&R ONLY: CPT | Performed by: INTERNAL MEDICINE

## 2022-08-08 PROCEDURE — 78452 HT MUSCLE IMAGE SPECT MULT: CPT | Performed by: INTERNAL MEDICINE

## 2022-08-08 PROCEDURE — 93017 CV STRESS TEST TRACING ONLY: CPT

## 2022-08-08 PROCEDURE — 3430000000 HC RX DIAGNOSTIC RADIOPHARMACEUTICAL: Performed by: RADIOLOGY

## 2022-08-08 RX ADMIN — Medication 12 MILLICURIE: at 08:44

## 2022-08-08 RX ADMIN — Medication 35 MILLICURIE: at 12:03

## 2022-08-08 NOTE — PROCEDURES
Exercise stress test    Resting blood pressure: 158/90    Resting heart rate: 75    Target heart rate: 135    Peak exercise heart rate: 146    Peak exercise blood pressure: 200/90    He exercised for 6 minutes and 30 seconds on Poncho protocol    METS: 9    Resting ECG: Sinus rhythm, 75 bpm.  Normal axis. Nonspecific ST-T waves. Stress ECG: Sinus tachycardia, no dysrhythmias, no ST-T wave changes for ischemia.     To treadmill score: 6.5    Assessment:  Good exercise capacity  Hypertensive response to exercise  Negative stress ECG for ischemia  Low risk [facility] treadmill score  Perfusion imaging pending

## 2022-08-09 ENCOUNTER — TELEPHONE (OUTPATIENT)
Dept: PRIMARY CARE CLINIC | Age: 62
End: 2022-08-09

## 2022-08-09 RX ORDER — ROSUVASTATIN CALCIUM 10 MG/1
10 TABLET, COATED ORAL DAILY
Qty: 30 TABLET | Refills: 3 | Status: CANCELLED | OUTPATIENT
Start: 2022-08-09

## 2022-08-09 NOTE — TELEPHONE ENCOUNTER
Patient needs refill on cholesterol medication. He is not currently taking it because he does not have any and didn't know he was suppose to be taking it. Also patient is concerned because all his test are coming back normal but he is still having a hard time breathing. He said you had discussed with him possibly trying a medication for anxiety. He would like something called into [facility].

## 2022-08-10 RX ORDER — FLUOXETINE 10 MG/1
10 CAPSULE ORAL DAILY
Qty: 30 CAPSULE | Refills: 3 | Status: SHIPPED
Start: 2022-08-10 | End: 2022-10-25 | Stop reason: ALTCHOICE

## 2022-08-10 RX ORDER — ROSUVASTATIN CALCIUM 10 MG/1
10 TABLET, COATED ORAL DAILY
Qty: 30 TABLET | Refills: 3 | Status: SHIPPED
Start: 2022-08-10 | End: 2022-10-25 | Stop reason: ALTCHOICE

## 2022-08-15 ENCOUNTER — OFFICE VISIT (OUTPATIENT)
Dept: CARDIOLOGY CLINIC | Age: 62
End: 2022-08-15
Payer: MEDICAID

## 2022-08-15 VITALS
RESPIRATION RATE: 18 BRPM | BODY MASS INDEX: 33.38 KG/M2 | HEART RATE: 77 BPM | SYSTOLIC BLOOD PRESSURE: 144 MMHG | DIASTOLIC BLOOD PRESSURE: 70 MMHG | WEIGHT: 225.4 LBS | HEIGHT: 69 IN

## 2022-08-15 DIAGNOSIS — R06.02 SOB (SHORTNESS OF BREATH): Primary | ICD-10-CM

## 2022-08-15 DIAGNOSIS — F43.21 COMPLICATED GRIEF: ICD-10-CM

## 2022-08-15 PROBLEM — M25.512 CHRONIC PAIN OF BOTH SHOULDERS: Status: RESOLVED | Noted: 2021-08-31 | Resolved: 2022-08-15

## 2022-08-15 PROBLEM — G89.29 CHRONIC PAIN OF BOTH SHOULDERS: Status: RESOLVED | Noted: 2021-08-31 | Resolved: 2022-08-15

## 2022-08-15 PROBLEM — M25.511 CHRONIC PAIN OF BOTH SHOULDERS: Status: RESOLVED | Noted: 2021-08-31 | Resolved: 2022-08-15

## 2022-08-15 PROBLEM — R06.01 ORTHOPNEA: Status: RESOLVED | Noted: 2022-07-22 | Resolved: 2022-08-15

## 2022-08-15 PROCEDURE — 93000 ELECTROCARDIOGRAM COMPLETE: CPT | Performed by: INTERNAL MEDICINE

## 2022-08-15 PROCEDURE — G8417 CALC BMI ABV UP PARAM F/U: HCPCS | Performed by: INTERNAL MEDICINE

## 2022-08-15 PROCEDURE — 99204 OFFICE O/P NEW MOD 45 MIN: CPT | Performed by: INTERNAL MEDICINE

## 2022-08-15 PROCEDURE — G8427 DOCREV CUR MEDS BY ELIG CLIN: HCPCS | Performed by: INTERNAL MEDICINE

## 2022-08-15 RX ORDER — TAMSULOSIN HYDROCHLORIDE 0.4 MG/1
CAPSULE ORAL
COMMUNITY
Start: 2022-08-09

## 2022-08-15 NOTE — PROGRESS NOTES
Chief Complaint   Patient presents with    Shortness of Breath       Patient Active Problem List    Diagnosis Date Noted    Complicated grief 09/39/1924     Priority: Medium    Anxiety 07/22/2022     Priority: Medium    NATH (dyspnea on exertion) 07/22/2022     Priority: Medium    Suspected sleep apnea 07/22/2022     Priority: Medium    Low testosterone 07/22/2022     Priority: Medium    SOB (shortness of breath) 07/22/2022     Priority: Medium    Opioid dependence with current use ([facility] [address] 07/22/2022     Priority: Medium    Current moderate episode of major depressive disorder without prior episode ([facility] [address] 08/31/2021    Vitamin D deficiency 12/22/2016    Risk for coronary artery disease greater than 20% in next 10 years 12/22/2016    Tobacco use 12/20/2016       Current Outpatient Medications   Medication Sig Dispense Refill    tamsulosin (FLOMAX) 0.4 MG capsule TAKE ONE CAPSULE BY MOUTH EVERY DAY AT BEDTIME      rosuvastatin (CRESTOR) 10 MG tablet Take 1 tablet by mouth in the morning. 30 tablet 3    FLUoxetine (PROZAC) 10 MG capsule Take 1 capsule by mouth in the morning. 30 capsule 3     No current facility-administered medications for this visit. No Known Allergies    Vitals:    08/15/22 0729   BP: (!) 144/70   Pulse: 77   Resp: 18   Weight: 225 lb 6.4 oz (102.2 kg)   Height: 5' 9\" (1.753 m)                 SUBJECTIVE: Lina Tristan presents to the office today for consult - dr Sunshine Lama. Lost his wife of 41 years 14 months ago and has had tremendous difficulty working thru the grieving process. Has abused legal and illegal drugs to numb the pain, now stopped. He complains of  random periods of not being able to catch his breath,  and denies   chest pain, orthopnea, and paroxysmal nocturnal dyspnea  Does no activities, has no family local.   Had TTE and ETT-N which I reviewed with patient.           Physical Exam   BP (!) 144/70   Pulse 77   Resp 18   Ht 5' 9\" (1.753 m)   Wt 225 lb 6.4 oz (102.2 kg) BMI 33.29 kg/m²   Constitutional: Oriented to person, place, and time. Well-developed and well-nourished. No distress. Head: Normocephalic and atraumatic. Neck: No hepatojugular reflux and no JVD present. Carotid bruit is not present. Cardiovascular: Normal rate, regular rhythm, normal heart sounds and intact distal pulses. No gallop and no friction rub. No murmur heard. Pulmonary/Chest: Effort normal and breath sounds normal. No respiratory distress. No wheezes. No rales. Abdominal: Soft. Bowel sounds are normal. No distension and no mass. No tenderness. No rebound and no guarding. Musculoskeletal: No edema and no tenderness. Neurological: Alert and oriented to person, place, and time. Skin: Skin is warm and dry. No rash noted. Psychiatric: Normal mood and affect. Behavior is normal.     EKG:  normal sinus rhythm, left axis deviation, unchanged from previous tracings.     ASSESSMENT AND PLAN:  Patient Active Problem List   Diagnosis    Tobacco use    Vitamin D deficiency    Risk for coronary artery disease greater than 20% in next 10 years    Current moderate episode of major depressive disorder without prior episode (HCC)    Anxiety    NATH (dyspnea on exertion)    Suspected sleep apnea    Low testosterone    SOB (shortness of breath)    Opioid dependence with current use ([facility] [address]    Complicated grief     Patient with severe grief reaction and likely panic attack type symptoms  Normal CV exam and ekg  Low risk stress with no symptoms provoked on TM  We discussed grief reduction strategies, including exercise      Kristel Zaragoza M.D  [facility] Cardiology

## 2022-08-30 PROCEDURE — 94726 PLETHYSMOGRAPHY LUNG VOLUMES: CPT | Performed by: INTERNAL MEDICINE

## 2022-08-30 PROCEDURE — 94060 EVALUATION OF WHEEZING: CPT | Performed by: INTERNAL MEDICINE

## 2022-08-30 PROCEDURE — 94729 DIFFUSING CAPACITY: CPT | Performed by: INTERNAL MEDICINE

## 2022-08-30 NOTE — PROCEDURES
[address]                  [address] [facility]  [facility]                               PULMONARY FUNCTION    PATIENT NAME: Hui Washburn                   :        1960  MED REC NO:   [id number]                            ROOM:  ACCOUNT NO:   [de-identified]                           ADMIT DATE: 2022  PROVIDER:     Kojo Briggs DO    DATE OF PROCEDURE:  2022    A 80-year-old male, 69 inches, 220 pounds, 45-pack-a-year smoker. Pulmonary function tests showed forced vital capacity postbronchodilator  5.16 liters  116% of predicted, FEV1 3.92 liters 115% of predicted with  an FEV1-FVC ratio of 76%. Midflow rates are normal 3.80 liters per  second which is 134% of predicted. Maximum voluntary ventilation is 136  liters per minute, 101% of predicted. Static lung volumes with slow vital capacity of 4.82 liters 108% of  predicted. Inspiratory capacity of 3.88 liters 119% of predicted. Expiratory reserve volume 0.94 liters, 80% of predicted. Thoracic gas  volume of 5.05 liters 142% of predicted. Thoracic gas volume 5.05  liters 142% of predicted. Residual volume 4.11 liters, 185% of  predicted. Total lung capacity of 8.93 liters 131% of predicted with an  RV to TLC ratio 139%. The diffusion capacity is normal at 36.08  mL/min/mmHg, 116% of predicted. IMPRESSION:  Overall pulmonary testing is fairly normal.  There is no  definitive airflow obstruction, reversibility, or loss in gas transfer. There is no restrictive process. Total lung capacity is normal.   Testing, however, reveals signs of hyperinflation and dynamic air  trapping as indicated by increased thoracic gas volume, residual volume,  and RV to TLC ratio.   If indicated, consider methacholine challenge  test.        Consuelo Dennison DO    D: 2022 8:35:59       T: 2022 8:38:22     [email or web address]  Job#: [id number]     Doc#: [id number]    CC:

## 2022-08-30 NOTE — PROCEDURES
[address]                  [address] [facility]  [facility]                               PULMONARY FUNCTION    PATIENT NAME: Saqib Ac                   :        1960  MED REC NO:   [id number]                            ROOM:  ACCOUNT NO:   [de-identified]                           ADMIT DATE: 2022  PROVIDER:     Chelly Harvey DO    DATE OF PROCEDURE:  2022    REFERRING PROVIDER:  Roverto Mcclellan MD    HEIGHT:  69 inches. WEIGHT:  220 pounds. DIAGNOSES:  1. Shortness of breath. 2.  Dyspnea after any exertion. TOBACCO USE:  The patient smokes one pack a day for 45 years. SPIROMETRY:  FVC is 4.88 liters, which is 110% of predicted. FEV1 is  3.85 liters, which is 113% of predicted. FEV1 to FVC ratio is 79. There is no significant bronchodilator response. MVV is 136, which is  101% of predicted. LUNG VOLUMES:  SVC is 4.82 liters, which is 108% of predicted. ERV is  0.94 liters, which is 80% of predicted. RV is 4.11 liters, which is  185% of predicted. Total lung capacity is 8.93 liters, which is 131% of  predicted. DIFFUSION:  Diffusion is 36.80 liters, which is 116% of predicted. Corrected for alveolar ventilation is 130% of predicted. FLOW VOLUME LOOP:  Flow volume loop is flattened in the expiratory limb. OVERALL INTERPRETATION:  Pulmonary function testing shows no  obstruction. There is significant air trapping. Diffusion is within  normal limits. Please clinically correlate.         Winn Habermann, DO    D: 2022 8:50:31       T: 2022 8:52:50     [email or web address]  Job#: [id number]     Doc#: [id number]    CC:

## 2022-10-25 ENCOUNTER — OFFICE VISIT (OUTPATIENT)
Dept: ENDOCRINOLOGY | Age: 62
End: 2022-10-25
Payer: MEDICAID

## 2022-10-25 VITALS
WEIGHT: 232 LBS | SYSTOLIC BLOOD PRESSURE: 170 MMHG | BODY MASS INDEX: 34.36 KG/M2 | HEIGHT: 69 IN | HEART RATE: 75 BPM | DIASTOLIC BLOOD PRESSURE: 106 MMHG | OXYGEN SATURATION: 99 % | RESPIRATION RATE: 16 BRPM

## 2022-10-25 DIAGNOSIS — E55.9 VITAMIN D DEFICIENCY: ICD-10-CM

## 2022-10-25 DIAGNOSIS — E29.1 MALE HYPOGONADISM: ICD-10-CM

## 2022-10-25 DIAGNOSIS — E29.1 MALE HYPOGONADISM: Primary | ICD-10-CM

## 2022-10-25 LAB
FOLLICLE STIMULATING HORMONE: 9.7 MIU/ML
LUTEINIZING HORMONE: 3.6 MIU/ML
PROLACTIN: 16.33 NG/ML

## 2022-10-25 PROCEDURE — G8427 DOCREV CUR MEDS BY ELIG CLIN: HCPCS | Performed by: INTERNAL MEDICINE

## 2022-10-25 PROCEDURE — 4004F PT TOBACCO SCREEN RCVD TLK: CPT | Performed by: INTERNAL MEDICINE

## 2022-10-25 PROCEDURE — G8484 FLU IMMUNIZE NO ADMIN: HCPCS | Performed by: INTERNAL MEDICINE

## 2022-10-25 PROCEDURE — 3017F COLORECTAL CA SCREEN DOC REV: CPT | Performed by: INTERNAL MEDICINE

## 2022-10-25 PROCEDURE — 99204 OFFICE O/P NEW MOD 45 MIN: CPT | Performed by: INTERNAL MEDICINE

## 2022-10-25 PROCEDURE — G8417 CALC BMI ABV UP PARAM F/U: HCPCS | Performed by: INTERNAL MEDICINE

## 2022-10-25 NOTE — PROGRESS NOTES
[address] [place] [address] Department of Endocrinology Diabetes and Metabolism   [address] [facility] [address]   Phone: [phone]  Fax: [phone]    Date of Service: 10/25/2022    Primary Care Physician: Ba Erwin MD  Referring physician: Han Abraham MD  Provider: Veronica Shell MD    Reason for the visit:  Male Hypogonadism     History of Present Illness: The history is provided by the patient. No  was used. Accuracy of the patient data is excellent. Tatianna Henrandez is a very pleasant 64 y.o. previously healthy male presents for evaluation of low Testosterone   The patient lost his wife recently and since then he hasn't been feeling good. He reports significant depression and not interested in anything. In the past he used to be on T booster to help building muscle but stop doing this about 2 years ago. Labs 7/2022- Total T was 237, Free 39.2, SHBG 42. He underwent normal puberty and reports no history of gonadal problems. He has no  children and denies h/o infertility. Had vasectomy at age 36. He has had no gynecomastia or galactorrhea and denies any  history of testicular or head trauma and didn't noticed any decrease in his testicular size during this time. He has had no headache or peripheral vision disturbances. The patient denied any change in shaving frequency and has had no fragility fractures. The patient also denied history of easy bruising, think skin, Plethora, Striae, Acne, muscle weakness, skin tags or increase shoe size. Using non-prescription narcotics and using them for pain control        PAST MEDICAL HISTORY   No past medical history on file. PAST SURGICAL HISTORY   No past surgical history on file. SOCIAL HISTORY   Tobacco:   reports that he has been smoking cigarettes. He started smoking about 49 years ago. He has a 44.00 pack-year smoking history.  He has never used smokeless tobacco.  Alcohol:   reports no history of alcohol use. Drugs:   reports current drug use. Drugs: Marijuana (William Colla), Opiates , Cocaine, and Other-see comments. FAMILY HISTORY   No family history on file. ALLERGIES AND DRUG REACTIONS   No Known Allergies    CURRENT MEDICATIONS   Current Outpatient Medications   Medication Sig Dispense Refill    tamsulosin (FLOMAX) 0.4 MG capsule TAKE ONE CAPSULE BY MOUTH EVERY DAY AT BEDTIME       No current facility-administered medications for this visit. Review of Systems  Constitutional: No fever, no chills, no diaphoresis, no generalized weakness. HEENT: No blurred vision, No sore throat, no ear pain, no hair loss  Neck: denied any neck swelling, difficulty swallowing,   Cadrdiopulomary: No CP, SOB or palpitation, No orthopnea or PND. No cough or wheezing. GI: No N/V/D, no constipation, No abdominal pain, no melena or hematochezia   : Denied any dysuria, hematuria, flank pain, discharge, or incontinence. Skin: denied any rash, ulcer, Hirsute, or hyperpigmentation. MSK: denied any joint deformity, joint pain/swelling, muscle pain, or back pain. Neuro: no numbess, no tingling, no weakness,     OBJECTIVE    BP (!) 170/106   Pulse 75   Resp 16   Ht 5' 9\" (1.753 m)   Wt 232 lb (105.2 kg)   SpO2 99%   BMI 34.26 kg/m²   Wt Readings from Last 6 Encounters:   10/25/22 232 lb (105.2 kg)   08/15/22 225 lb 6.4 oz (102.2 kg)   07/22/22 221 lb 12.8 oz (100.6 kg)   08/31/21 210 lb (95.3 kg)   03/06/19 223 lb (101.2 kg)   03/06/18 212 lb (96.2 kg)       Physical examination:  General: awake alert, oriented x3, no abnormal position or movements. HEENT: normocephalic non traumatic, no Galactorrhea  Neck: supple, no LN enlargement, no thyromegaly, no thyroid tenderness, No buffalo hump, no supraventricular fullness,   Pulm: Clear equal air entry no added sounds, no wheezing or rhonchi    CVS: S1 + S2, no murmur, no heave.  Dorsalis pedis pulse palpable   Abd: soft lax, no tenderness, no organomegaly, audible bowel sounds. Skin: warm, no lesions, no rash.  No Galactorrhea, no skin tags, no acanthosis Nigricans,   : Testicular size normal. No mass   Neuro: CN intact, sensation normal , muscle strength normal. No tremors   Psych: normal mood, and affect    Review of Laboratory Data:  I have reviewed the following:  Lab Results   Component Value Date/Time    WBC 9.9 07/23/2022 08:30 AM    RBC 5.09 07/23/2022 08:30 AM    HGB 15.3 07/23/2022 08:30 AM    HCT 44.8 07/23/2022 08:30 AM    MCV 88.0 07/23/2022 08:30 AM    MCH 30.1 07/23/2022 08:30 AM    MCHC 34.2 07/23/2022 08:30 AM    RDW 12.6 07/23/2022 08:30 AM     07/23/2022 08:30 AM    MPV 10.0 07/23/2022 08:30 AM      Lab Results   Component Value Date/Time     07/23/2022 08:30 AM    K 4.1 07/23/2022 08:30 AM    CO2 25 07/23/2022 08:30 AM    BUN 9 07/23/2022 08:30 AM    CREATININE 0.9 07/23/2022 08:30 AM    CALCIUM 9.5 07/23/2022 08:30 AM    LABGLOM >60 07/23/2022 08:30 AM    GFRAA >60 07/23/2022 08:30 AM      Lab Results   Component Value Date/Time    TSH 3.070 07/23/2022 08:30 AM     Lab Results   Component Value Date/Time    LABA1C 5.6 07/23/2022 08:30 AM    GLUCOSE 106 07/23/2022 08:30 AM     Lab Results   Component Value Date/Time    TRIG 180 07/23/2022 08:30 AM    HDL 39 07/23/2022 08:30 AM    LDLCALC 171 07/23/2022 08:30 AM    CHOL 246 07/23/2022 08:30 AM     Lab Results   Component Value Date/Time    VITD25 14 12/21/2016 08:21 AM     Lab Results   Component Value Date/Time    LABA1C 5.6 07/23/2022 08:30 AM    GLUCOSE 106 07/23/2022 08:30 AM     Lab Results   Component Value Date/Time    TESTOSTERONE 237 07/23/2022 08:30 AM     Lab Results   Component Value Date/Time    TESTFC 90 12/21/2016 08:21 AM     Lab Results   Component Value Date/Time    TESTOSTFR 1.4 12/21/2016 08:21 AM     Lab Results   Component Value Date/Time    TESTM 646 12/21/2016 08:21 AM     Lab Results   Component Value Date/Time    SHBG 42 07/23/2022 08:30 AM    SHBG 58 12/21/2016 08:21 AM     Lab Results   Component Value Date/Time    FSH 8.1 07/23/2022 08:30 AM     Lab Results   Component Value Date/Time    LH 2.9 07/23/2022 08:30 AM     No results found for: FERRITIN  Lab Results   Component Value Date/Time    PSA 5.00 09/21/2021 09:58 AM    PSA 4.73 08/31/2021 03:13 PM    PSA 2.39 12/21/2016 08:21 AM     Lab Results   Component Value Date/Time    VITD25 14 (L) 12/21/2016 08:21 AM       ASSESSMENT & RECOMMENDATIONS   Shruti Browning, a 64 y.o.-old male seen in for management of      Evaluation for low Testosterone   Remote use of Testosterone supplement and current use of narcotics are the likely cause of his low Testosterone level   There is significant hypogonadal signs on exam and most of his current symptoms are likely related to depression for losing his wife   Will check AM total/free Testosterone, SHBG, FSH, LH and prolactin level    Discussed side effects and complications of testosterone therapy including worsening sleep apnea, risk of prostate Ca, Polycythemia and cardiovascular risk)  Will follow results and proceed accordingly     Depression   Pt denies any suicidal ideation or to harm himself    Managed by PCP     vitD deficiency   Encouraged starting vitD 2000U/day OTC     I personally reviewed external notes from PCP and other patient's care team providers, and personally interpreted labs associated with the above diagnosis. I also ordered labs to further assess and manage the above addressed medical conditions. Return in about 6 months (around 4/25/2023) for Male Hypogonadism, VitD deficiency. The above issues were reviewed with the patient who understood and agreed with the plan. Thank you for allowing us to participate in the care of this patient. Please do not hesitate to contact us with any additional questions. Diagnosis Orders   1.  Male hypogonadism  Testosterone, free, total    Follicle Stimulating Hormone    Luteinizing Hormone    Prolactin 2. Vitamin D deficiency          Clayton Costa MD  Endocrinologist, [facility] and Endocrinology   [address] [facility] [address]   Phone: [phone]  Fax: [phone]  -----------------------------  An electronic signature was used to authenticate this note.  Jessenia Glez MD on 10/25/2022 at 9:20 AM

## 2022-10-27 LAB
SEX HORMONE BINDING GLOBULIN: 65 NMOL/L (ref 11–80)
TESTOSTERONE FREE-NONMALE: 47.7 PG/ML (ref 47–244)
TESTOSTERONE TOTAL: 378 NG/DL (ref 220–1000)

## 2022-10-28 ENCOUNTER — TELEPHONE (OUTPATIENT)
Dept: ENDOCRINOLOGY | Age: 62
End: 2022-10-28

## 2022-10-28 NOTE — TELEPHONE ENCOUNTER
Endocrine staff,   Please notify pt that I have reviewed your recent results.      Your Testosterone level is normal and I don't recommend any Testosterone therapy

## 2022-11-18 ENCOUNTER — OFFICE VISIT (OUTPATIENT)
Dept: PRIMARY CARE CLINIC | Age: 62
End: 2022-11-18
Payer: MEDICAID

## 2022-11-18 VITALS
RESPIRATION RATE: 20 BRPM | HEART RATE: 71 BPM | HEIGHT: 69 IN | DIASTOLIC BLOOD PRESSURE: 88 MMHG | BODY MASS INDEX: 33.33 KG/M2 | WEIGHT: 225 LBS | OXYGEN SATURATION: 99 % | SYSTOLIC BLOOD PRESSURE: 136 MMHG | TEMPERATURE: 98.5 F

## 2022-11-18 DIAGNOSIS — G89.29 CHRONIC PAIN OF BOTH SHOULDERS: Primary | ICD-10-CM

## 2022-11-18 DIAGNOSIS — M25.511 CHRONIC PAIN OF BOTH SHOULDERS: Primary | ICD-10-CM

## 2022-11-18 DIAGNOSIS — M25.512 CHRONIC PAIN OF BOTH SHOULDERS: Primary | ICD-10-CM

## 2022-11-18 PROCEDURE — G8427 DOCREV CUR MEDS BY ELIG CLIN: HCPCS

## 2022-11-18 PROCEDURE — 4004F PT TOBACCO SCREEN RCVD TLK: CPT

## 2022-11-18 PROCEDURE — 3017F COLORECTAL CA SCREEN DOC REV: CPT

## 2022-11-18 PROCEDURE — G8417 CALC BMI ABV UP PARAM F/U: HCPCS

## 2022-11-18 PROCEDURE — 99213 OFFICE O/P EST LOW 20 MIN: CPT

## 2022-11-18 PROCEDURE — G8484 FLU IMMUNIZE NO ADMIN: HCPCS

## 2022-11-18 NOTE — PROGRESS NOTES
Chief Complaint:   Shoulder Pain (Patient states he is a patient of Dr. Rohan Mota & he called to be seen today & was told doctor could not see him. States he has been experiencing severe shoulder pain for a few weeks now. Patient states he wants injections in both of his shoulders. )      History of Present Illness   Source of history provided by:  patient. Windy Seymour is a 64 y.o. old male presenting to walk-in with right and left shoulder pain x multiple months. The pain is aggravated by movement and alleviated with rest. The patient has had shoulder injections in the past by his PCP and is here for shoulder injections. Denies any weakness, paresthesias, swelling, neck pain or injury, HA, hand weakness, or associated CP or SOB. Review of Systems   Unless otherwise stated in this report or unable to obtain because of the patient's clinical or mental status as evidenced by the medical record, this patients's positive and negative responses for Review of Systems, constitutional, psych, eyes, ENT, cardiovascular, respiratory, gastrointestinal, neurological, genitourinary, musculoskeletal, integument systems and systems related to the presenting problem are either stated in the preceding or were not pertinent or were negative for the symptoms and/or complaints related to the medical problem. Past Medical History:  has no past medical history on file. Past Surgical History:  has no past surgical history on file. Social History:  reports that he has been smoking cigarettes. He started smoking about 50 years ago. He has a 44.00 pack-year smoking history. He has never used smokeless tobacco. He reports current drug use. Drugs: Marijuana (Madie Hughes), Opiates , Cocaine, and Other-see comments. He reports that he does not drink alcohol. Family History: family history is not on file. Allergies: Patient has no known allergies.     Physical Exam   Vital Signs: /88   Pulse 71   Temp 98.5 °F (36.9 °C) (Temporal)   Resp 20   Ht 5' 9\" (1.753 m)   Wt 225 lb (102.1 kg)   SpO2 99%   BMI 33.23 kg/m²  Oxygen Saturation Interpretation: Normal.    Constitutional:  Alert, development consistent with age. Neck:  Normal ROM. Supple. Non-tender. Chest: Heart RRR without pathological murmur or gallop. Lungs CTAB without W/R/R. Shoulder:  right and left             Tenderness: TTP over anterior aspect shoulder without any bony point tenderness. Swelling: No obvious swelling noted. Deformity: No obvious deformity. ROM: Limited ROM due to pain. UE/ strength 5/5 bilaterally. Negative drop arm test.            Skin:  No abrasions, bruising, or erythema noted. Neurovascular:              Sensory deficit: Sensation intact proximally and distally to the injury site. Pulse deficit: Distal pulses 2+ and bounding. Capillary refill: Less then 2 sec throughout. Elbow:  right and left             Tenderness:  No pain with ROM or palpation. Swelling: No edema noted. ROM: FROM without deficits. No pain with supination or pronation of the hand. Skin: No rash, abrasions, or bruising noted. Lymphatics: No lymphangitis or adenopathy noted. Neurological: Alert and oriented. Motor functions intact. Test Results Section   (All laboratory and radiology results have been personally reviewed by myself)  Labs:  No results found for this visit on 11/18/22. Radiology: All Radiology results interpreted by Radiologist unless otherwise noted. No results found. Assessment / Plan   Impression:  Cynthia Valle was seen today for shoulder pain. Diagnoses and all orders for this visit:    Chronic pain of both shoulders      Instructed the patient that urgent care does not do shoulder injections. However, he can go to the [facility] or [facility]gent care for further evaluation and treatment.  ED immediately with severe or worsening pain, paresthesias, weakness, CP, or SOB. Pt states understanding and is in agreement with this care plan. All questions answered. Electronically signed by NATASHA Mercado CNP   DD: 11/18/22    **This report was transcribed using voice recognition software. Every effort was made to ensure accuracy; however, inadvertent computerized transcription errors may be present.

## 2022-11-22 ENCOUNTER — OFFICE VISIT (OUTPATIENT)
Dept: PRIMARY CARE CLINIC | Age: 62
End: 2022-11-22
Payer: MEDICAID

## 2022-11-22 DIAGNOSIS — M25.511 CHRONIC RIGHT SHOULDER PAIN: Primary | ICD-10-CM

## 2022-11-22 DIAGNOSIS — M25.512 CHRONIC LEFT SHOULDER PAIN: ICD-10-CM

## 2022-11-22 DIAGNOSIS — G89.29 CHRONIC LEFT SHOULDER PAIN: ICD-10-CM

## 2022-11-22 DIAGNOSIS — F32.1 CURRENT MODERATE EPISODE OF MAJOR DEPRESSIVE DISORDER WITHOUT PRIOR EPISODE (HCC): ICD-10-CM

## 2022-11-22 DIAGNOSIS — G89.29 CHRONIC RIGHT SHOULDER PAIN: Primary | ICD-10-CM

## 2022-11-22 PROCEDURE — 3017F COLORECTAL CA SCREEN DOC REV: CPT | Performed by: FAMILY MEDICINE

## 2022-11-22 PROCEDURE — 99214 OFFICE O/P EST MOD 30 MIN: CPT | Performed by: FAMILY MEDICINE

## 2022-11-22 PROCEDURE — G8484 FLU IMMUNIZE NO ADMIN: HCPCS | Performed by: FAMILY MEDICINE

## 2022-11-22 PROCEDURE — 4004F PT TOBACCO SCREEN RCVD TLK: CPT | Performed by: FAMILY MEDICINE

## 2022-11-22 PROCEDURE — G8427 DOCREV CUR MEDS BY ELIG CLIN: HCPCS | Performed by: FAMILY MEDICINE

## 2022-11-22 PROCEDURE — 20610 DRAIN/INJ JOINT/BURSA W/O US: CPT | Performed by: FAMILY MEDICINE

## 2022-11-22 PROCEDURE — G8417 CALC BMI ABV UP PARAM F/U: HCPCS | Performed by: FAMILY MEDICINE

## 2022-11-22 RX ORDER — TRIAMCINOLONE ACETONIDE 40 MG/ML
40 INJECTION, SUSPENSION INTRA-ARTICULAR; INTRAMUSCULAR ONCE
Status: COMPLETED | OUTPATIENT
Start: 2022-11-22 | End: 2022-11-22

## 2022-11-22 RX ORDER — BUPROPION HYDROCHLORIDE 150 MG/1
150 TABLET ORAL EVERY MORNING
Qty: 30 TABLET | Refills: 2 | Status: SHIPPED | OUTPATIENT
Start: 2022-11-22

## 2022-11-22 RX ADMIN — TRIAMCINOLONE ACETONIDE 40 MG: 40 INJECTION, SUSPENSION INTRA-ARTICULAR; INTRAMUSCULAR at 14:48

## 2022-11-22 RX ADMIN — TRIAMCINOLONE ACETONIDE 40 MG: 40 INJECTION, SUSPENSION INTRA-ARTICULAR; INTRAMUSCULAR at 14:47

## 2022-11-22 SDOH — ECONOMIC STABILITY: FOOD INSECURITY: WITHIN THE PAST 12 MONTHS, THE FOOD YOU BOUGHT JUST DIDN'T LAST AND YOU DIDN'T HAVE MONEY TO GET MORE.: NEVER TRUE

## 2022-11-22 SDOH — ECONOMIC STABILITY: FOOD INSECURITY: WITHIN THE PAST 12 MONTHS, YOU WORRIED THAT YOUR FOOD WOULD RUN OUT BEFORE YOU GOT MONEY TO BUY MORE.: NEVER TRUE

## 2022-11-22 ASSESSMENT — SOCIAL DETERMINANTS OF HEALTH (SDOH): HOW HARD IS IT FOR YOU TO PAY FOR THE VERY BASICS LIKE FOOD, HOUSING, MEDICAL CARE, AND HEATING?: NOT HARD AT ALL

## 2022-11-22 ASSESSMENT — PATIENT HEALTH QUESTIONNAIRE - PHQ9
1. LITTLE INTEREST OR PLEASURE IN DOING THINGS: 3
9. THOUGHTS THAT YOU WOULD BE BETTER OFF DEAD, OR OF HURTING YOURSELF: 3
10. IF YOU CHECKED OFF ANY PROBLEMS, HOW DIFFICULT HAVE THESE PROBLEMS MADE IT FOR YOU TO DO YOUR WORK, TAKE CARE OF THINGS AT HOME, OR GET ALONG WITH OTHER PEOPLE: 3
8. MOVING OR SPEAKING SO SLOWLY THAT OTHER PEOPLE COULD HAVE NOTICED. OR THE OPPOSITE, BEING SO FIGETY OR RESTLESS THAT YOU HAVE BEEN MOVING AROUND A LOT MORE THAN USUAL: 0
5. POOR APPETITE OR OVEREATING: 3
7. TROUBLE CONCENTRATING ON THINGS, SUCH AS READING THE NEWSPAPER OR WATCHING TELEVISION: 0
SUM OF ALL RESPONSES TO PHQ9 QUESTIONS 1 & 2: 6
SUM OF ALL RESPONSES TO PHQ QUESTIONS 1-9: 20
2. FEELING DOWN, DEPRESSED OR HOPELESS: 3
6. FEELING BAD ABOUT YOURSELF - OR THAT YOU ARE A FAILURE OR HAVE LET YOURSELF OR YOUR FAMILY DOWN: 2
4. FEELING TIRED OR HAVING LITTLE ENERGY: 3
3. TROUBLE FALLING OR STAYING ASLEEP: 3
SUM OF ALL RESPONSES TO PHQ QUESTIONS 1-9: 17
SUM OF ALL RESPONSES TO PHQ QUESTIONS 1-9: 20
SUM OF ALL RESPONSES TO PHQ QUESTIONS 1-9: 20

## 2022-11-22 ASSESSMENT — ENCOUNTER SYMPTOMS
COLOR CHANGE: 0
COUGH: 0
SHORTNESS OF BREATH: 0
CHEST TIGHTNESS: 0
WHEEZING: 0

## 2022-11-22 ASSESSMENT — COLUMBIA-SUICIDE SEVERITY RATING SCALE - C-SSRS
2. HAVE YOU ACTUALLY HAD ANY THOUGHTS OF KILLING YOURSELF?: NO
1. WITHIN THE PAST MONTH, HAVE YOU WISHED YOU WERE DEAD OR WISHED YOU COULD GO TO SLEEP AND NOT WAKE UP?: YES
6. HAVE YOU EVER DONE ANYTHING, STARTED TO DO ANYTHING, OR PREPARED TO DO ANYTHING TO END YOUR LIFE?: NO

## 2022-11-22 NOTE — PROGRESS NOTES
Juris Kussmaul  : 1960    Chief Complaint:     Chief Complaint   Patient presents with    Shoulder Pain     Patient presents today d/t shoulder pain, states he wishes to have injections as soon as possible. Pain rated 5/10 today. HPI  Chronic bilateral shoulder pain. Has seen ortho and been evaluated. Not ready for surgical intervention. Has been getting steroid injections from them, but their fees are too high for him at the moment. Requesting bilateral shoulder injections. Decreased ROM, crepitus and pain. Restricting ADLs. Has known rotator issue. Last steroid intra-articular injections were 2021 with relief until few weeks ago. He does self-medicate with Percocet between injections, advised I cannot prescribe him opiates. Depression for last 18 months from Wife's passing. Did not like how he felt on the Zoloft or Prozac. Willing to try something different. Past medical, surgical, family and social histories reviewed and updated today as appropriate    Health Maintenance Due   Topic Date Due    Pneumococcal 0-64 years Vaccine (1 - PCV) Never done    HIV screen  Never done    Hepatitis C screen  Never done    DTaP/Tdap/Td vaccine (1 - Tdap) Never done    Colorectal Cancer Screen  Never done    Shingles vaccine (1 of 2) Never done    Low dose CT lung screening  2018    COVID-19 Vaccine (4 - Booster for Pfizer series) 2022    Flu vaccine (1) Never done    Prostate Specific Antigen (PSA) Screening or Monitoring  2022       Current Outpatient Medications   Medication Sig Dispense Refill    buPROPion (WELLBUTRIN XL) 150 MG extended release tablet Take 1 tablet by mouth every morning 30 tablet 2    tamsulosin (FLOMAX) 0.4 MG capsule TAKE ONE CAPSULE BY MOUTH EVERY DAY AT BEDTIME       No current facility-administered medications for this visit.        No Known Allergies    REVIEW OF SYSTEMS  Review of Systems   Constitutional:  Negative for activity change, appetite change, chills, diaphoresis and fever. Respiratory:  Negative for cough, chest tightness, shortness of breath and wheezing. Cardiovascular:  Negative for chest pain, palpitations and leg swelling. Musculoskeletal:  Positive for arthralgias. Skin:  Negative for color change, pallor and rash. Neurological:  Negative for tremors, weakness and numbness. Psychiatric/Behavioral:  Positive for dysphoric mood and suicidal ideas (passive). Negative for decreased concentration, self-injury and sleep disturbance. The patient is not nervous/anxious. All other systems reviewed and are negative. PHYSICAL EXAM  /78   Pulse 87   Temp 98.2 °F (36.8 °C) (Temporal)   Resp 20   Ht 5' 9\" (1.753 m)   Wt 231 lb 6.4 oz (105 kg)   SpO2 97%   BMI 34.17 kg/m²   Physical Exam  Vitals reviewed. Constitutional:       General: He is not in acute distress. Appearance: He is well-developed. He is not ill-appearing or diaphoretic. Cardiovascular:      Rate and Rhythm: Normal rate and regular rhythm. Heart sounds: No murmur heard. Pulmonary:      Effort: Pulmonary effort is normal.      Breath sounds: Normal breath sounds. No wheezing or rales. Musculoskeletal:         General: Tenderness present. No swelling, deformity or signs of injury. Right shoulder: Tenderness and crepitus present. No swelling, deformity, effusion or bony tenderness. Decreased range of motion. Normal pulse. Skin:     General: Skin is warm and dry. Findings: No erythema. ASSESSMENT/PLAN:     Diagnosis Orders   1. Chronic right shoulder pain  triamcinolone acetonide (KENALOG-40) injection 40 mg    31954 - DC DRAIN/INJECT LARGE JOINT/BURSA      2. Chronic left shoulder pain  triamcinolone acetonide (KENALOG-40) injection 40 mg    88195 - DC DRAIN/INJECT LARGE JOINT/BURSA      3.  Current moderate episode of major depressive disorder without prior episode (HCC)  buPROPion (WELLBUTRIN XL) 150 MG extended release tablet        Start Wellbutrin. Follow up on this in about 4 weeks. The patient was counseled on the options for treating the affected shoulder. These include but were not limited to trail of oral anti-inflammatories, oral steroids, physical therapy referral, or ortho consultation. The patient is electing injection. The risks of the injection were explained, including but not limited to infection, bleeding, bruising, tendon injury, skin contour deformity and soft tissue/fat necrosis. The patient gave verbal permission to proceed. The patient was placed in a seated position. The skin of both shoulder were prepped with iodine swab and alcohol prep. Utilizing a  Posterior approach an injection of 40 mg of Kenalog and 3 cc of 1% lidocaine without epinephrine was injected into the right shoulder after first aspirating to assure no blood return, followed by the left shoulder in same fashion. The injection site was then wiped again with alcohol and covered with a band aid. The procedure was tolerated well. Routine wound instructions given verbally to the patient. Advised to notify if bleeding, excessive bruising, or swelling develops. Reviewed age and gender appropriate health screening exams and vaccinations. Advisedpatient regarding importance of keeping up with recommended health maintenance and to schedule as soon as possible if overdue, as this is important in assessing for undiagnosed pathology, especially cancer. Patientverbalizes understanding and agrees. Call or go to ED immediately if symptoms worsen or persist.  No follow-ups on file. Sooner if necessary. Counseled regarding above diagnosis, including possible risks and complications,especially if left uncontrolled. Counseled regarding the possible side effects, risks, benefits and alternatives to treatment; patient and/or guardian verbalizes understanding. Advised patient to call with any newmedication issues.  All questions answered.     Estevan Gordon MD  11/22/22

## 2022-12-31 VITALS
RESPIRATION RATE: 20 BRPM | OXYGEN SATURATION: 97 % | SYSTOLIC BLOOD PRESSURE: 136 MMHG | HEART RATE: 87 BPM | DIASTOLIC BLOOD PRESSURE: 78 MMHG | TEMPERATURE: 98.2 F | HEIGHT: 69 IN | WEIGHT: 231.4 LBS | BODY MASS INDEX: 34.27 KG/M2

## 2023-01-03 ENCOUNTER — OFFICE VISIT (OUTPATIENT)
Dept: PRIMARY CARE CLINIC | Age: 63
End: 2023-01-03
Payer: MEDICAID

## 2023-01-03 VITALS
DIASTOLIC BLOOD PRESSURE: 80 MMHG | SYSTOLIC BLOOD PRESSURE: 140 MMHG | TEMPERATURE: 98.1 F | OXYGEN SATURATION: 99 % | RESPIRATION RATE: 16 BRPM | HEIGHT: 69 IN | WEIGHT: 218.4 LBS | BODY MASS INDEX: 32.35 KG/M2 | HEART RATE: 86 BPM

## 2023-01-03 DIAGNOSIS — F41.9 ANXIETY: ICD-10-CM

## 2023-01-03 DIAGNOSIS — F11.20 OPIOID DEPENDENCE WITH CURRENT USE (HCC): ICD-10-CM

## 2023-01-03 DIAGNOSIS — F43.21 COMPLICATED GRIEF: ICD-10-CM

## 2023-01-03 DIAGNOSIS — F32.1 CURRENT MODERATE EPISODE OF MAJOR DEPRESSIVE DISORDER WITHOUT PRIOR EPISODE (HCC): Primary | ICD-10-CM

## 2023-01-03 PROCEDURE — G8484 FLU IMMUNIZE NO ADMIN: HCPCS | Performed by: FAMILY MEDICINE

## 2023-01-03 PROCEDURE — 3017F COLORECTAL CA SCREEN DOC REV: CPT | Performed by: FAMILY MEDICINE

## 2023-01-03 PROCEDURE — G8427 DOCREV CUR MEDS BY ELIG CLIN: HCPCS | Performed by: FAMILY MEDICINE

## 2023-01-03 PROCEDURE — 99214 OFFICE O/P EST MOD 30 MIN: CPT | Performed by: FAMILY MEDICINE

## 2023-01-03 PROCEDURE — 4004F PT TOBACCO SCREEN RCVD TLK: CPT | Performed by: FAMILY MEDICINE

## 2023-01-03 PROCEDURE — G8417 CALC BMI ABV UP PARAM F/U: HCPCS | Performed by: FAMILY MEDICINE

## 2023-01-03 RX ORDER — TRAZODONE HYDROCHLORIDE 50 MG/1
50 TABLET ORAL NIGHTLY
Qty: 30 TABLET | Refills: 1 | Status: SHIPPED | OUTPATIENT
Start: 2023-01-03

## 2023-01-03 NOTE — PROGRESS NOTES
Cami Morales  : 1960    Chief Complaint:     Chief Complaint   Patient presents with    Depression     Continuing Wellbutrin, does not need a refill at this time. Has since improved diet and has since lost 13 pounds in last 6 weeks. HPI  6 week follow up from Wellbutrin start. Did not tolerate 2 SSRIs and did not want to try another one, so we started Wellbutrin about 6 weeks ago. Feels his mood has been getting much worse. No side effects from the Wellbutrin. Not sleeping at all, sporadic. Wheels won't stop turning in head at night. He has not used any Percocet since last visit. Worsening suicidal ideation. Does not want to harm himself. He does have a gun at home. He would like to try talking to someone again. Wants someone with \"life experience\" and someone who knows what he's been through. He has no support system. He recently went out with his brother-in-law and had a good time with him, but he wants him back dating and Inder Monterroso is not ready for that yet.      Past medical, surgical, family and social histories reviewed and updated today as appropriate    Health Maintenance Due   Topic Date Due    Pneumococcal 0-64 years Vaccine (1 - PCV) Never done    HIV screen  Never done    Hepatitis C screen  Never done    DTaP/Tdap/Td vaccine (1 - Tdap) Never done    Colorectal Cancer Screen  Never done    Shingles vaccine (1 of 2) Never done    Low dose CT lung screening  2018    COVID-19 Vaccine (4 - Booster for Pfizer series) 2022    Flu vaccine (1) Never done    Prostate Specific Antigen (PSA) Screening or Monitoring  2022       Current Outpatient Medications   Medication Sig Dispense Refill    traZODone (DESYREL) 50 MG tablet Take 1 tablet by mouth nightly 30 tablet 1    buPROPion (WELLBUTRIN XL) 150 MG extended release tablet Take 1 tablet by mouth every morning 30 tablet 2    tamsulosin (FLOMAX) 0.4 MG capsule Take 0.4 mg by mouth daily Taking two at bedtime since starting Wellbutrin per Urologist       No current facility-administered medications for this visit.       No Known Allergies      REVIEW OF SYSTEMS  Review of Systems   Constitutional:  Negative for activity change, appetite change, chills, diaphoresis, fatigue, fever and unexpected weight change.   Psychiatric/Behavioral:  Positive for decreased concentration, dysphoric mood, sleep disturbance and suicidal ideas. Negative for agitation, behavioral problems, confusion, hallucinations and self-injury. The patient is not nervous/anxious and is not hyperactive.      PHYSICAL EXAM  BP (!) 140/80 (Site: Left Upper Arm, Position: Sitting, Cuff Size: Large Adult)   Pulse 86   Temp 98.1 °F (36.7 °C) (Temporal)   Resp 16   Ht 5' 9\" (1.753 m)   Wt 218 lb 6.4 oz (99.1 kg)   SpO2 99%   BMI 32.25 kg/m²   Physical Exam  Constitutional:       General: He is not in acute distress.     Appearance: Normal appearance. He is not ill-appearing or diaphoretic.   Cardiovascular:      Rate and Rhythm: Normal rate and regular rhythm.      Heart sounds: Normal heart sounds.   Pulmonary:      Effort: Pulmonary effort is normal.      Breath sounds: Normal breath sounds.   Neurological:      Mental Status: He is alert.   Psychiatric:         Mood and Affect: Mood is anxious and depressed. Mood is not elated. Affect is tearful. Affect is not labile.         Speech: Speech normal.         Behavior: Behavior is cooperative.         Thought Content: Thought content is not paranoid. Thought content includes suicidal ideation. Thought content does not include homicidal ideation. Thought content does not include homicidal or suicidal plan.         Cognition and Memory: Cognition normal.         Judgment: Judgment normal.         The 10-year ASCVD risk score (Cora CASTANEDA, et al., 2019) is: 23.8%    Values used to calculate the score:      Age: 62 years      Sex: Male      Is Non- : No      Diabetic: No      Tobacco smoker: Yes       Systolic Blood Pressure: 759 mmHg      Is BP treated: No      HDL Cholesterol: 39 mg/dL      Total Cholesterol: 246 mg/dL    ASSESSMENT/PLAN:    1. Current moderate episode of major depressive disorder without prior episode (HCC)  -     traZODone (DESYREL) 50 MG tablet; Take 1 tablet by mouth nightly, Disp-30 tablet, R-1Normal  2. Opioid dependence with current use ([address]  3. Complicated grief  4. Anxiety    Does not like Wellbutrin, will stop for now. Trazodone nightly for mood and current insomnia. Continues with passive suicidal thoughts without plan or intent. Spoke with [facility] Psych office and have him scheduled for this Friday for initial intake and treatment. Reviewed age and gender appropriate health screening exams and vaccinations. Advised patient regarding importance of keeping up with recommended health maintenance and to schedule as soon as possible if overdue, as this is important in assessing for undiagnosed pathology, especially cancer. Patient verbalizes understanding and agrees. Call or go to ED immediately if symptoms worsen or persist.  No follow-ups on file. Sooner if necessary. Counseled regarding above diagnosis(es), including possible risks and complications, especially if left uncontrolled. Counseled regarding the possible side effects, risks, benefits and alternatives to treatment; patient and/or guardian verbalizes understanding. Advised patient to call with any new medication issues. All questions answered.     Cici Anderson MD  1/3/23

## 2023-01-20 ENCOUNTER — TELEPHONE (OUTPATIENT)
Dept: PRIMARY CARE CLINIC | Age: 63
End: 2023-01-20

## 2023-01-20 NOTE — TELEPHONE ENCOUNTER
Pt called in asking if he should stop his Wellbutrin, do to him getting mad talked to Dilia Moran the NP and he recommends that he doesn't stop his medication and to follow up with Dr. Thakkar Channel

## 2023-02-21 DIAGNOSIS — F32.1 CURRENT MODERATE EPISODE OF MAJOR DEPRESSIVE DISORDER WITHOUT PRIOR EPISODE (HCC): ICD-10-CM

## 2023-02-21 RX ORDER — BUPROPION HYDROCHLORIDE 150 MG/1
TABLET ORAL
Qty: 30 TABLET | Refills: 3 | Status: SHIPPED | OUTPATIENT
Start: 2023-02-21

## 2023-02-21 NOTE — TELEPHONE ENCOUNTER
Last Appointment:  1/3/2023  Future Appointments   Date Time Provider Mariella Leija   4/25/2023  9:30 AM Zack Bernheim, MD [facility]

## 2023-02-21 NOTE — TELEPHONE ENCOUNTER
[facility] states need refill on BUPROPION Med. Has none left, takes each morning, took last dose this morning. Pharmacy[facility]. Leti Diggs .Electronically signed by Salomón Zavaleta on 2/21/23 at 3:13 PM EST

## 2023-04-11 PROBLEM — F11.24 OPIOID DEPENDENCE WITH OPIOID-INDUCED MOOD DISORDER (HCC): Status: ACTIVE | Noted: 2022-07-22

## 2023-04-11 PROBLEM — I10 PRIMARY HYPERTENSION: Status: ACTIVE | Noted: 2023-04-11

## 2023-04-11 PROBLEM — F32.4 MAJOR DEPRESSIVE DISORDER WITH SINGLE EPISODE, IN PARTIAL REMISSION (HCC): Status: ACTIVE | Noted: 2021-08-31

## 2023-08-15 ENCOUNTER — TELEPHONE (OUTPATIENT)
Dept: PRIMARY CARE CLINIC | Age: 63
End: 2023-08-15

## 2023-08-15 DIAGNOSIS — M25.512 CHRONIC PAIN OF BOTH SHOULDERS: Primary | ICD-10-CM

## 2023-08-15 DIAGNOSIS — F32.1 CURRENT MODERATE EPISODE OF MAJOR DEPRESSIVE DISORDER WITHOUT PRIOR EPISODE (HCC): ICD-10-CM

## 2023-08-15 DIAGNOSIS — G89.29 CHRONIC PAIN OF BOTH SHOULDERS: Primary | ICD-10-CM

## 2023-08-15 DIAGNOSIS — M25.511 CHRONIC PAIN OF BOTH SHOULDERS: Primary | ICD-10-CM

## 2023-08-15 RX ORDER — BUPROPION HYDROCHLORIDE 150 MG/1
TABLET ORAL
Qty: 30 TABLET | Refills: 3 | Status: SHIPPED | OUTPATIENT
Start: 2023-08-15

## 2023-08-15 NOTE — TELEPHONE ENCOUNTER
Patient states if can put him back on the Wellbutrin, he had some left over from last Rx so he took some and felt better. He wants a new Rx sent to pharmacy.

## 2023-08-15 NOTE — TELEPHONE ENCOUNTER
Patient notified. Patient tried exercises and cream which helped very little with shoulder pain. Patient would like ortho referral at this time since the other treatment has not helped him.

## 2023-09-07 ENCOUNTER — OFFICE VISIT (OUTPATIENT)
Dept: ORTHOPEDIC SURGERY | Age: 63
End: 2023-09-07

## 2023-09-07 VITALS — BODY MASS INDEX: 31.4 KG/M2 | WEIGHT: 212 LBS | HEIGHT: 69 IN

## 2023-09-07 DIAGNOSIS — M25.512 BILATERAL SHOULDER PAIN, UNSPECIFIED CHRONICITY: Primary | ICD-10-CM

## 2023-09-07 DIAGNOSIS — M75.81 ROTATOR CUFF TENDINITIS, RIGHT: ICD-10-CM

## 2023-09-07 DIAGNOSIS — M25.511 BILATERAL SHOULDER PAIN, UNSPECIFIED CHRONICITY: Primary | ICD-10-CM

## 2023-09-07 DIAGNOSIS — M75.22 BICEPS TENDINITIS OF LEFT SHOULDER: ICD-10-CM

## 2023-09-07 RX ORDER — BUPIVACAINE HYDROCHLORIDE 2.5 MG/ML
2 INJECTION, SOLUTION INFILTRATION; PERINEURAL ONCE
Status: COMPLETED | OUTPATIENT
Start: 2023-09-07 | End: 2023-09-07

## 2023-09-07 RX ORDER — TRIAMCINOLONE ACETONIDE 40 MG/ML
40 INJECTION, SUSPENSION INTRA-ARTICULAR; INTRAMUSCULAR ONCE
Status: COMPLETED | OUTPATIENT
Start: 2023-09-07 | End: 2023-09-07

## 2023-09-07 RX ORDER — MELOXICAM 7.5 MG/1
7.5 TABLET ORAL DAILY
Qty: 30 TABLET | Refills: 0 | Status: SHIPPED | OUTPATIENT
Start: 2023-09-07

## 2023-09-07 RX ADMIN — BUPIVACAINE HYDROCHLORIDE 5 MG: 2.5 INJECTION, SOLUTION INFILTRATION; PERINEURAL at 10:37

## 2023-09-07 RX ADMIN — TRIAMCINOLONE ACETONIDE 40 MG: 40 INJECTION, SUSPENSION INTRA-ARTICULAR; INTRAMUSCULAR at 10:38

## 2023-09-07 NOTE — PROGRESS NOTES
strength with resisted rotator cuff muscle testing. Negative Hornblower. Negative empty can. Negative drop arm. Negative belly press. IMAGING:     XRAY: Multiple views of bilateral shoulders independently interpreted by myself and discussed with patient. He has well-maintained glenohumeral joint spaces. Mild AC joint narrowing bilaterally. No fractures or dislocations noted. Radiographic findings reviewed with patient    ASSESSMENT   Bilateral shoulder subacromial bursitis with rotator cuff tendinitis  Left shoulder biceps tendinitis proximal      PLAN  Recommend conservative treatment. He has good strength on resisted testing. I am going to give him a steroid injection in his left biceps tendon sheath in his right subacromial space. I will also start him on my home exercise rotator cuff program.  If this fails of relief we could consider MRIs in the future. We can do the shots every 3 months or as needed. I am also going to send in a prescription for Mobic to the pharmacy for anti-inflammatory effects. He is to take this once a day with food. He is not to take other anti-inflammatories with taking this medication. Procedure Note:  Right Shoulder steroid injection     The Right shoulder was identified as the injection site. The risk and benefits of a cortisone injection were explained and the patient consented to the injection. Under sterile conditions, the subacromial space was injected from posterior approach with a mixture of 40 mg of Kenalog, 2 cc  0.25% Marcaine without complication. A sterile bandage was applied. Left shoulder anterior biceps tendon injection:    The left shoulder was identified as a site of injection. The risks of cortisone injections were explained and he consented to the procedure. Under sterile conditions the anterior biceps tendon sheath on the left was injected with 40 mg of Kenalog and 2 mL of 0.25% Marcaine without complication.   A sterile dressing was

## 2023-09-07 NOTE — PATIENT INSTRUCTIONS
Rotator Cuff: Exercises  Introduction  Here are some examples of exercises for you to try. The exercises may be suggested for a condition or for rehabilitation. Start each exercise slowly. Ease off the exercises if you start to have pain. You will be told when to start these exercises and which ones will work bestfor you. How to do the exercises  Pendulum swing  If you have pain in your back, do not do this exercise. Hold on to a table or the back of a chair with your good arm. Then bend forward a little and let your sore arm hang straight down. This exercise does not use the arm muscles. Rather, use your legs and your hips to create movement that makes your arm swing freely. Use the movement from your hips and legs to guide the slightly swinging arm back and forth like a pendulum (or elephant trunk). Then guide it in circles that start small (about the size of a dinner plate). Make the circles a bit larger each day, as your pain allows. Do this exercise for 5 minutes, 5 to 7 times each day. As you have less pain, try bending over a little farther to do this exercise. This will increase the amount of movement at your shoulder. Posterior stretching exercise  Hold the elbow of your injured arm with your other hand. Use your hand to pull your injured arm gently up and across your body. You will feel a gentle stretch across the back of your injured shoulder. Hold for at least 15 to 30 seconds. Then slowly lower your arm. Repeat 2 to 4 times. Up-the-back stretch  Your doctor or physical therapist may want you to wait to do this stretch until you have regained most of your range of motion and strength. You can do this stretch in different ways. Hold any of these stretches for at least 15 to 30seconds. Repeat them 2 to 4 times. Light stretch: Put your hand in your back pocket. Let it rest there to stretch your shoulder. Moderate stretch:  With your other hand, hold your injured arm (palm outward) behind

## 2023-09-21 ENCOUNTER — OFFICE VISIT (OUTPATIENT)
Dept: ORTHOPEDIC SURGERY | Age: 63
End: 2023-09-21
Payer: MEDICAID

## 2023-09-21 VITALS — BODY MASS INDEX: 31.4 KG/M2 | HEIGHT: 69 IN | WEIGHT: 212 LBS

## 2023-09-21 DIAGNOSIS — M75.22 BICEPS TENDINITIS OF LEFT SHOULDER: Primary | ICD-10-CM

## 2023-09-21 PROCEDURE — G8427 DOCREV CUR MEDS BY ELIG CLIN: HCPCS | Performed by: STUDENT IN AN ORGANIZED HEALTH CARE EDUCATION/TRAINING PROGRAM

## 2023-09-21 PROCEDURE — 99212 OFFICE O/P EST SF 10 MIN: CPT | Performed by: STUDENT IN AN ORGANIZED HEALTH CARE EDUCATION/TRAINING PROGRAM

## 2023-09-21 PROCEDURE — G8417 CALC BMI ABV UP PARAM F/U: HCPCS | Performed by: STUDENT IN AN ORGANIZED HEALTH CARE EDUCATION/TRAINING PROGRAM

## 2023-09-21 PROCEDURE — 3017F COLORECTAL CA SCREEN DOC REV: CPT | Performed by: STUDENT IN AN ORGANIZED HEALTH CARE EDUCATION/TRAINING PROGRAM

## 2023-09-21 PROCEDURE — 4004F PT TOBACCO SCREEN RCVD TLK: CPT | Performed by: STUDENT IN AN ORGANIZED HEALTH CARE EDUCATION/TRAINING PROGRAM

## 2023-09-21 NOTE — PROGRESS NOTES
CHIEF COMPLAINT:   Chief Complaint   Patient presents with    Follow-up     Left shoulder pain, yesterday the left bicep popped and now he has a big lump      HPI update 9/21/2023: He was feeling very well after his steroid injections. On Tuesday he felt a pop in his left shoulder and now has a deformity in his biceps. This was very painful for 1 day but this is settled back down and is currently not having any pain. He is happy with his injections and he feels much better today. HPI:      Jael Najera is a 58y.o. year old male with chronic bilateral shoulder pain. This has been going on since 1989. 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 For the past years he had received subacromial steroid injection by his family doctor with good relief with the most recent one over a year ago did not seem to help. He has tried some home exercise without relief. He has chronic shoulder pain. On the left it is mostly anterior along his biceps groove. On the right it is with overhead activity. He is right-hand dominant. Denies any recent injuries. Denies fevers or chills. Denies numbness tingling. PAST MEDICAL HISTORY  History reviewed. No pertinent past medical history. PAST SURGICAL HISTORY  History reviewed. No pertinent surgical history. FAMILY HISTORY   History reviewed. No pertinent family history.     SOCIAL HISTORY  Social History     Occupational History    Not on file   Tobacco Use    Smoking status: Every Day     Packs/day: 1.00     Years: 44.00     Additional pack years: 0.00     Total pack years: 44.00     Types: Cigarettes     Start date: 11/30/1972    Smokeless tobacco: Never   Substance and Sexual Activity    Alcohol use: No    Drug use: Yes     Types: Marijuana ([place]), Opiates , Cocaine, Other-see comments     Comment: h/o heroine, cocaine, opiates, marijuana     Sexual activity: Not on file       CURRENT MEDICATIONS     Current

## 2024-02-01 ENCOUNTER — OFFICE VISIT (OUTPATIENT)
Dept: ORTHOPEDIC SURGERY | Age: 64
End: 2024-02-01

## 2024-02-01 DIAGNOSIS — M25.511 BILATERAL SHOULDER PAIN, UNSPECIFIED CHRONICITY: Primary | ICD-10-CM

## 2024-02-01 DIAGNOSIS — M25.512 BILATERAL SHOULDER PAIN, UNSPECIFIED CHRONICITY: Primary | ICD-10-CM

## 2024-02-01 RX ORDER — BUPIVACAINE HYDROCHLORIDE 2.5 MG/ML
2 INJECTION, SOLUTION INFILTRATION; PERINEURAL ONCE
Status: COMPLETED | OUTPATIENT
Start: 2024-02-01 | End: 2024-02-01

## 2024-02-01 RX ORDER — TRIAMCINOLONE ACETONIDE 40 MG/ML
40 INJECTION, SUSPENSION INTRA-ARTICULAR; INTRAMUSCULAR ONCE
Status: COMPLETED | OUTPATIENT
Start: 2024-02-01 | End: 2024-02-01

## 2024-02-01 RX ADMIN — TRIAMCINOLONE ACETONIDE 40 MG: 40 INJECTION, SUSPENSION INTRA-ARTICULAR; INTRAMUSCULAR at 08:48

## 2024-02-01 RX ADMIN — BUPIVACAINE HYDROCHLORIDE 5 MG: 2.5 INJECTION, SOLUTION INFILTRATION; PERINEURAL at 08:48

## 2024-02-01 RX ADMIN — TRIAMCINOLONE ACETONIDE 40 MG: 40 INJECTION, SUSPENSION INTRA-ARTICULAR; INTRAMUSCULAR at 08:49

## 2024-02-01 NOTE — PROGRESS NOTES
CHIEF COMPLAINT:   Chief Complaint   Patient presents with    Follow-up     Bl shoulder pain, he wants injections today, last injections were 09/21/23        HPI update 2/1/2024: He is here today for repeat shoulder injections.  His last was in September.  His biceps tendon pain after proximal rupture has resolved.  He is having impingement type pain with overhead activity.  He responded well to his last set of injections.  Denies any recent changes.  Denies fever or chills.  Denies numbness tingling.    HPI update 9/21/2023: He was feeling very well after his steroid injections.  On Tuesday he felt a pop in his left shoulder and now has a deformity in his biceps.  This was very painful for 1 day but this is settled back down and is currently not having any pain.  He is happy with his injections and he feels much better today.    HPI:      Giuliano Whaley is a 63 y.o. year old male with chronic bilateral shoulder pain.  This has been going on since 1989.  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  For the past years he had received subacromial steroid injection by his family doctor with good relief with the most recent one over a year ago did not seem to help.  He has tried some home exercise without relief.  He has chronic shoulder pain.  On the left it is mostly anterior along his biceps groove.  On the right it is with overhead activity.  He is right-hand dominant.  Denies any recent injuries.  Denies fevers or chills.  Denies numbness tingling.    PAST MEDICAL HISTORY  History reviewed. No pertinent past medical history.    PAST SURGICAL HISTORY  History reviewed. No pertinent surgical history.    FAMILY HISTORY   History reviewed. No pertinent family history.    SOCIAL HISTORY  Social History     Occupational History    Not on file   Tobacco Use    Smoking status: Every Day     Current packs/day: 1.00     Average packs/day: 1 pack/day for 51.2 years (51.2 ttl

## 2024-08-15 ENCOUNTER — OFFICE VISIT (OUTPATIENT)
Dept: ORTHOPEDIC SURGERY | Age: 64
End: 2024-08-15
Payer: MEDICAID

## 2024-08-15 DIAGNOSIS — G56.02 CARPAL TUNNEL SYNDROME OF LEFT WRIST: Primary | ICD-10-CM

## 2024-08-15 PROCEDURE — 4004F PT TOBACCO SCREEN RCVD TLK: CPT | Performed by: STUDENT IN AN ORGANIZED HEALTH CARE EDUCATION/TRAINING PROGRAM

## 2024-08-15 PROCEDURE — 99214 OFFICE O/P EST MOD 30 MIN: CPT | Performed by: STUDENT IN AN ORGANIZED HEALTH CARE EDUCATION/TRAINING PROGRAM

## 2024-08-15 PROCEDURE — G8417 CALC BMI ABV UP PARAM F/U: HCPCS | Performed by: STUDENT IN AN ORGANIZED HEALTH CARE EDUCATION/TRAINING PROGRAM

## 2024-08-15 PROCEDURE — G8427 DOCREV CUR MEDS BY ELIG CLIN: HCPCS | Performed by: STUDENT IN AN ORGANIZED HEALTH CARE EDUCATION/TRAINING PROGRAM

## 2024-08-15 PROCEDURE — 3017F COLORECTAL CA SCREEN DOC REV: CPT | Performed by: STUDENT IN AN ORGANIZED HEALTH CARE EDUCATION/TRAINING PROGRAM

## 2024-08-15 NOTE — PROGRESS NOTES
CHIEF COMPLAINT:   Chief Complaint   Patient presents with    Carpal Tunnel     Left CTS has shooting pain, weakness, keeps him up at night, numbness. Had an EMG in 2017 but never followed up        HPI:     Giuliano Whaley is a 63 y.o. year old male who is seen today  for evaluation of bilateral wrist and hand pain/numbness/tingling.  He states has been ongoing for some time.  He has complaints of nocturnal symptoms that are waking him up at night.  He states his left hand is worse than his right.  EMG completed in 2017 states that his left hand has moderate median nerve dysfunction in his right is mild to moderate.  He states his pain and numbness/tingling have only gotten worse since his EMG in the left hand.  His right hand at this time is not bothersome.  He has mild symptoms in his thumb, pointer finger and small finger, and severe symptoms in his middle and ring finger.  He has tried Tylenol ibuprofen and bracing with no relief.    PAST MEDICAL HISTORY  No past medical history on file.    PAST SURGICAL HISTORY  No past surgical history on file.    FAMILY HISTORY   No family history on file.    SOCIAL HISTORY  Social History     Occupational History    Not on file   Tobacco Use    Smoking status: Every Day     Current packs/day: 1.00     Average packs/day: 1 pack/day for 51.7 years (51.7 ttl pk-yrs)     Types: Cigarettes     Start date: 11/30/1972    Smokeless tobacco: Never   Substance and Sexual Activity    Alcohol use: No    Drug use: Yes     Types: Marijuana (Weed), Opiates , Cocaine, Other-see comments     Comment: h/o heroine, cocaine, opiates, marijuana     Sexual activity: Not on file       CURRENT MEDICATIONS     Current Outpatient Medications:     tamsulosin (FLOMAX) 0.4 MG capsule, Take 1 capsule by mouth daily Taking two at bedtime since starting Wellbutrin per Urologist, Disp: , Rfl:     meloxicam (MOBIC) 7.5 MG tablet, Take 1 tablet by mouth daily (Patient not taking: Reported on 2/1/2024),

## 2024-08-15 NOTE — PATIENT INSTRUCTIONS
Procedure: Left Carpal Tunnel Release    Your surgery is scheduled for 9/3/2024 at 8:00 a.m with Dr. Sidney Castellano DO at the main [place] on [facility] in [place] .  You will need to report to Preop area  that morning at 6:00 a.m. .   All surgery start times are subject to change. You will be notified by office and/or PAT department if your surgery time changes. If you need to cancel/reschedule your surgery for any reason, please contact [facility] at [phone] ASA.   You are having Outpatient surgery, but plan for 1-2 nights in the hospital for recovery if needed.  Preadmission Testing (PAT) department at [facility] will contact you with further details regarding pre-operative blood work, where to park and enter the hospital, your medication list, etc. If you have any surgery related questions please contact PAT at [facility] at [phone].  Nothing to eat or drink after midnight the night before surgery.  You may take a pain pill and any other medicine PAT instructs you to take with small sip of water if needed.  Continue with ice and elevation to reduce swelling  If you are taking Aspirin or Lovenox, hold the day of surgery. If taking Eliquis, hold this 48 hours prior to surgery.   Hold all NSAIDs (non-steroidal anti-inflammatories like Advil,Mobic/Meloxicam, motrin, ibuprofen, etc) and all Over the counter vitamins, minerals and herbal supplements for 7 days prior to surgery.- Last Dose 8/26/2024    Call office with any question or concerns: [phone]    All surgical patients will be gradually tapered off narcotic pain medication post operatively, this office will not continue narcotics longer than 6 weeks from date of surgery. If narcotics are required longer than this time period you will be referred to pain management.

## 2024-08-16 ENCOUNTER — PREP FOR PROCEDURE (OUTPATIENT)
Dept: ORTHOPEDIC SURGERY | Age: 64
End: 2024-08-16

## 2024-08-16 DIAGNOSIS — G56.02 CARPAL TUNNEL SYNDROME ON LEFT: ICD-10-CM

## 2024-08-19 ENCOUNTER — TELEPHONE (OUTPATIENT)
Dept: ORTHOPEDIC SURGERY | Age: 64
End: 2024-08-19

## 2024-08-19 NOTE — TELEPHONE ENCOUNTER
Prior Authorization [facility] Portal    Procedure:  Left Carpal Tunnel Release  Procedure Code: 47940  Diagnosis Code:  G56.02  Date:  9/3/2024  [place]  [facility]  [place]: [place]  Physician:  Sidney Castellano D.O.  Auth Number: [id number]  Valid:  9/3/2024 - 12/2/2024  Contact: [facility] Provider Portal

## 2024-08-27 NOTE — PROGRESS NOTES
[facility]   PRE-ADMISSION TESTING GENERAL INSTRUCTIONS  PAT Phone Number: [phone]      GENERAL INSTRUCTIONS:    [x] Antibacterial Soap Shower Night before AND the Morning of procedure.  [x] Do not wear lotions, powders, deodorant the morning of surgery.  [x] No solid food after midnight. You may have SIPS of clear liquids up until 2 hours before your arrival time to the hospital.   [x] You may brush your teeth, gargle, but do not swallow water.   [x] No tobacco products, illegal drugs, or alcohol within 24 hours of your surgery.  [x] Jewelry or valuables should not be brought to the hospital. All body and/or tongue piercing's must be removed prior to arriving to hospital. No contact lens or hair pins.   [x] Arrange transportation with a responsible adult  to and from the hospital. Arrange for someone to be with you for the remainder of the day and for 24 hours after your procedure due to having had anesthesia.          -Who will be your  for transportation? Friend         -Who will be staying with you for 24 hrs after your procedure? Friend   [x] Bring insurance card and photo ID.  [x] Bring copy of living will or healthcare power of  papers to be placed in your electronic record.  [] Urine Pregnancy test will be preformed the day of surgery. A specimen sample may be brought from home.  [] Transfusion (Green) Bracelet: Please bring with you to hospital, day of surgery.     PARKING INSTRUCTIONS:     [x] ARRIVAL DATE & TIME:  9/3  @  0800   [x] Times are subject to change. We will contact you the business day before surgery if that were to occur.  [x] Enter into the [facility] Entrance. Two people may accompany you. Masks are not required.  [x] Parking Lot \"I\" is where you will park. It is located on the corner of [facility] and [address]. The entrance is on [address].   Only one vehicle - per patient, is permitted in parking lot.   Upon

## 2024-09-02 NOTE — DISCHARGE INSTRUCTIONS
[facility]   Sidney Castellano D.O.      [address] [address]   Phone [phone]  Fax [phone]      Post - Operative Care Instructions for Carpal Tunnel Release   Keep dressing clean and dry   May use ice bag to operative area, protect dressing from getting wet.   Dressing can be removed five days after surgery, may shower or get incision wet at this time (NO SOAKING, NO OINTMENT). Re-cover with a band-aid.   Move/wiggle fingers and thumb frequently (beginning right after surgery) to prevent stiffness.   No heavy lifting, pushing or pulling for 3-4 weeks.  Keep arm/hand elevated to prevent swelling.   You will be provided a prescription for pain medication upon discharge, please  at pharmacy provided during office visit.   Take pain medication for four days as needed, after switch to Tylenol and/or Ibuprofen.   Follow up in office two weeks after surgery (date provided at office appointment)      Notify physician if any of the following:   Increased swelling   Drainage from incision   Fever or chills

## 2024-09-03 ENCOUNTER — HOSPITAL ENCOUNTER (OUTPATIENT)
Age: 64
Setting detail: OUTPATIENT SURGERY
Discharge: HOME | End: 2024-09-03
Attending: STUDENT IN AN ORGANIZED HEALTH CARE EDUCATION/TRAINING PROGRAM | Admitting: STUDENT IN AN ORGANIZED HEALTH CARE EDUCATION/TRAINING PROGRAM
Payer: MEDICAID

## 2024-09-03 VITALS
SYSTOLIC BLOOD PRESSURE: 135 MMHG | DIASTOLIC BLOOD PRESSURE: 81 MMHG | HEART RATE: 58 BPM | TEMPERATURE: 97.8 F | RESPIRATION RATE: 16 BRPM | WEIGHT: 205 LBS | HEIGHT: 69 IN | BODY MASS INDEX: 30.36 KG/M2 | OXYGEN SATURATION: 99 %

## 2024-09-03 DIAGNOSIS — G56.02 CARPAL TUNNEL SYNDROME ON LEFT: Primary | ICD-10-CM

## 2024-09-03 PROCEDURE — 2580000003 HC RX 258: Performed by: STUDENT IN AN ORGANIZED HEALTH CARE EDUCATION/TRAINING PROGRAM

## 2024-09-03 PROCEDURE — 6360000002 HC RX W HCPCS: Performed by: STUDENT IN AN ORGANIZED HEALTH CARE EDUCATION/TRAINING PROGRAM

## 2024-09-03 PROCEDURE — 7100000010 HC PHASE II RECOVERY - FIRST 15 MIN: Performed by: STUDENT IN AN ORGANIZED HEALTH CARE EDUCATION/TRAINING PROGRAM

## 2024-09-03 PROCEDURE — 3600000004 HC SURGERY LEVEL 4 BASE: Performed by: STUDENT IN AN ORGANIZED HEALTH CARE EDUCATION/TRAINING PROGRAM

## 2024-09-03 PROCEDURE — 2709999900 HC NON-CHARGEABLE SUPPLY: Performed by: STUDENT IN AN ORGANIZED HEALTH CARE EDUCATION/TRAINING PROGRAM

## 2024-09-03 PROCEDURE — 2500000003 HC RX 250 WO HCPCS: Performed by: STUDENT IN AN ORGANIZED HEALTH CARE EDUCATION/TRAINING PROGRAM

## 2024-09-03 PROCEDURE — 3600000014 HC SURGERY LEVEL 4 ADDTL 15MIN: Performed by: STUDENT IN AN ORGANIZED HEALTH CARE EDUCATION/TRAINING PROGRAM

## 2024-09-03 PROCEDURE — 7100000011 HC PHASE II RECOVERY - ADDTL 15 MIN: Performed by: STUDENT IN AN ORGANIZED HEALTH CARE EDUCATION/TRAINING PROGRAM

## 2024-09-03 RX ORDER — SODIUM CHLORIDE 0.9 % (FLUSH) 0.9 %
5-40 SYRINGE (ML) INJECTION PRN
Status: DISCONTINUED | OUTPATIENT
Start: 2024-09-03 | End: 2024-09-03 | Stop reason: HOSPADM

## 2024-09-03 RX ORDER — HYDROCODONE BITARTRATE AND ACETAMINOPHEN 5; 325 MG/1; MG/1
1 TABLET ORAL EVERY 6 HOURS PRN
Qty: 20 TABLET | Refills: 0 | Status: SHIPPED | OUTPATIENT
Start: 2024-09-03 | End: 2024-09-08

## 2024-09-03 RX ORDER — SODIUM CHLORIDE 9 MG/ML
INJECTION, SOLUTION INTRAVENOUS PRN
Status: DISCONTINUED | OUTPATIENT
Start: 2024-09-03 | End: 2024-09-03 | Stop reason: HOSPADM

## 2024-09-03 RX ORDER — SODIUM CHLORIDE 9 MG/ML
INJECTION, SOLUTION INTRAVENOUS CONTINUOUS
Status: DISCONTINUED | OUTPATIENT
Start: 2024-09-03 | End: 2024-09-03 | Stop reason: HOSPADM

## 2024-09-03 RX ORDER — SODIUM CHLORIDE 0.9 % (FLUSH) 0.9 %
5-40 SYRINGE (ML) INJECTION EVERY 12 HOURS SCHEDULED
Status: DISCONTINUED | OUTPATIENT
Start: 2024-09-03 | End: 2024-09-03 | Stop reason: HOSPADM

## 2024-09-03 RX ADMIN — CEFAZOLIN 2000 MG: 2 INJECTION, POWDER, FOR SOLUTION INTRAMUSCULAR; INTRAVENOUS at 11:02

## 2024-09-03 ASSESSMENT — PAIN - FUNCTIONAL ASSESSMENT: PAIN_FUNCTIONAL_ASSESSMENT: 0-10

## 2024-09-03 NOTE — H&P
Department of Orthopedic Surgery  Attending H&P Note            HISTORY OF PRESENT ILLNESS:           Giuliano Whaley is a 63 y.o. year old male who is seen today  for evaluation of bilateral wrist and hand pain/numbness/tingling.  He states has been ongoing for some time.  He has complaints of nocturnal symptoms that are waking him up at night.  He states his left hand is worse than his right.  EMG completed in 2017 states that his left hand has moderate median nerve dysfunction in his right is mild to moderate.  He states his pain and numbness/tingling have only gotten worse since his EMG in the left hand.  His right hand at this time is not bothersome.  He has mild symptoms in his thumb, pointer finger and small finger, and severe symptoms in his middle and ring finger.  He has tried Tylenol ibuprofen and bracing with no relief.     He is here today for an elective left carpal tunnel release    Past Medical History:    History reviewed. No pertinent past medical history.    Past Surgical History:    History reviewed. No pertinent surgical history.    Current Medications:   Current Facility-Administered Medications: 0.9 % sodium chloride infusion, , IntraVENous, Continuous  sodium chloride flush 0.9 % injection 5-40 mL, 5-40 mL, IntraVENous, 2 times per day  sodium chloride flush 0.9 % injection 5-40 mL, 5-40 mL, IntraVENous, PRN  0.9 % sodium chloride infusion, , IntraVENous, PRN  ceFAZolin (ANCEF) 2,000 mg in sterile water 20 mL IV syringe, 2,000 mg, IntraVENous, On Call to OR    Allergies:  Anesthesia s-i-40 [propofol]    Social History:   TOBACCO:   reports that he has been smoking cigarettes. He started smoking about 51 years ago. He has a 51.8 pack-year smoking history. He has never used smokeless tobacco.  ETOH:   reports current alcohol use.  DRUGS:   reports current drug use. Drugs: Marijuana (Weed), Opiates , Cocaine, and Other-see comments.    Family History:   History reviewed. No pertinent family

## 2024-09-03 NOTE — PROGRESS NOTES
Patient states that his ride ( friend Beth) will be here at 1200 on 9/3/2024. Patient stated that he does not have her number as he left his phone in the car. This RN reiterated to the patient that he is unable to drive home. He stated that Beth is the one driving home. Patient states that he does not receive blood. Patient states he is a DNR and does not want anything done to bring him back to life.

## 2024-09-03 NOTE — OP NOTE
Operative Note      Patient: Giuliano Whaley  YOB: 1960  MRN: [id number]    Date of Procedure: 9/3/2024    Pre-Op Diagnosis Codes:      * Carpal tunnel syndrome on left [G56.02]    Post-Op Diagnosis: Same       Procedure(s):  LEFT CARPAL TUNNEL RELEASE    Surgeon(s):  Sidney Castellano DO    Assistant:   Resident: Glenn Ren DO    Anesthesia: Local    Estimated Blood Loss (mL): Minimal    Complications: None    Specimens:   * No specimens in log *    Implants:  * No implants in log *      Drains: * No LDAs found *    Findings:  Infection Present At Time Of Surgery (PATOS) (choose all levels that have infection present):  No infection present  Other Findings: see operative report below     Detailed Description of Procedure:   Giuliano Whaley is a 63 y.o. year old male who is seen today for evaluation of bilateral wrist and hand pain/numbness/tingling. He states has been ongoing for some time. He has complaints of nocturnal symptoms that are waking him up at night. He states his left hand is worse than his right. EMG completed in 2017 states that his left hand has moderate median nerve dysfunction in his right is mild to moderate. He states his pain and numbness/tingling have only gotten worse since his EMG in the left hand. His right hand at this time is not bothersome. He has mild symptoms in his thumb, pointer finger and small finger, and severe symptoms in his middle and ring finger. He has tried Tylenol ibuprofen and bracing with no relief.  His clinical exam and EMG matches diagnosis.He has tried conservative treatment including activity modification nighttime bracing anti-inflammatories. None of this was provided any relief. He like to proceed with surgery. Risk benefits and alternatives were discussed in detail. He understands the risk of blood loss, blood clot, infection, damage surrounding neurovascular structures including arterial arch median nerve and its branches, incomplete release,

## 2024-09-03 NOTE — PROGRESS NOTES
CLINICAL PHARMACY NOTE: MEDS TO BEDS    Total # of Prescriptions Filled: 1   The following medications were delivered to the patient:  Hydrocodone/apap 5-325    Additional Documentation:   Delivered to pt at bedside

## 2024-09-03 NOTE — BRIEF OP NOTE
Brief Postoperative Note      Patient: Giuliano Whaley  YOB: 1960  MRN: [id number]    Date of Procedure: 9/3/2024    Pre-Op Diagnosis Codes:      * Carpal tunnel syndrome on left [G56.02]    Post-Op Diagnosis: Same       Procedure(s):  LEFT CARPAL TUNNEL RELEASE    Surgeon(s):  Sidney Castellano DO    Assistant:  Resident: Glenn Ren DO    Anesthesia: Local    Estimated Blood Loss (mL): Minimal    Complications: None    Specimens:   * No specimens in log *    Implants:  * No implants in log *      Drains: * No LDAs found *    Findings:  Infection Present At Time Of Surgery (PATOS) (choose all levels that have infection present):  No infection present  Other Findings: left carpal tunnel release    Electronically signed by Sidney Castellano DO on 9/3/2024 at 10:28 AM

## 2024-09-19 ENCOUNTER — OFFICE VISIT (OUTPATIENT)
Dept: ORTHOPEDIC SURGERY | Age: 64
End: 2024-09-19

## 2024-09-19 DIAGNOSIS — G56.02 CARPAL TUNNEL SYNDROME ON LEFT: Primary | ICD-10-CM

## 2024-09-19 PROCEDURE — 99024 POSTOP FOLLOW-UP VISIT: CPT | Performed by: STUDENT IN AN ORGANIZED HEALTH CARE EDUCATION/TRAINING PROGRAM

## 2025-04-11 ENCOUNTER — OFFICE VISIT (OUTPATIENT)
Dept: PRIMARY CARE CLINIC | Age: 65
End: 2025-04-11
Payer: MEDICAID

## 2025-04-11 VITALS
RESPIRATION RATE: 16 BRPM | HEART RATE: 64 BPM | HEIGHT: 69 IN | DIASTOLIC BLOOD PRESSURE: 83 MMHG | TEMPERATURE: 97.2 F | WEIGHT: 218.6 LBS | SYSTOLIC BLOOD PRESSURE: 134 MMHG | OXYGEN SATURATION: 97 % | BODY MASS INDEX: 32.38 KG/M2

## 2025-04-11 DIAGNOSIS — E78.5 DYSLIPIDEMIA: ICD-10-CM

## 2025-04-11 DIAGNOSIS — Z00.00 PREVENTATIVE HEALTH CARE: Primary | ICD-10-CM

## 2025-04-11 DIAGNOSIS — F33.42 RECURRENT MAJOR DEPRESSIVE DISORDER, IN FULL REMISSION: ICD-10-CM

## 2025-04-11 DIAGNOSIS — Z12.11 COLON CANCER SCREENING: ICD-10-CM

## 2025-04-11 PROBLEM — F11.24 OPIOID DEPENDENCE WITH OPIOID-INDUCED MOOD DISORDER (HCC): Status: RESOLVED | Noted: 2022-07-22 | Resolved: 2025-04-11

## 2025-04-11 LAB
ALBUMIN: 4.6 G/DL (ref 3.5–5.2)
ALP BLD-CCNC: 60 U/L (ref 40–129)
ALT SERPL-CCNC: 18 U/L (ref 0–40)
ANION GAP SERPL CALCULATED.3IONS-SCNC: 12 MMOL/L (ref 7–16)
AST SERPL-CCNC: 20 U/L (ref 0–39)
BILIRUB SERPL-MCNC: 0.9 MG/DL (ref 0–1.2)
BUN BLDV-MCNC: 22 MG/DL (ref 6–23)
CALCIUM SERPL-MCNC: 9.6 MG/DL (ref 8.6–10.2)
CHLORIDE BLD-SCNC: 103 MMOL/L (ref 98–107)
CHOLESTEROL, TOTAL: 281 MG/DL
CO2: 24 MMOL/L (ref 22–29)
CREAT SERPL-MCNC: 1.1 MG/DL (ref 0.7–1.2)
GFR, ESTIMATED: 78 ML/MIN/1.73M2
GLUCOSE BLD-MCNC: 90 MG/DL (ref 74–99)
HCT VFR BLD CALC: 46.6 % (ref 37–54)
HDLC SERPL-MCNC: 46 MG/DL
HEMOGLOBIN: 15.6 G/DL (ref 12.5–16.5)
LDL CHOLESTEROL: 195 MG/DL
MCH RBC QN AUTO: 30.1 PG (ref 26–35)
MCHC RBC AUTO-ENTMCNC: 33.5 G/DL (ref 32–34.5)
MCV RBC AUTO: 89.8 FL (ref 80–99.9)
PDW BLD-RTO: 13.1 % (ref 11.5–15)
PLATELET # BLD: 265 K/UL (ref 130–450)
PMV BLD AUTO: 9.8 FL (ref 7–12)
POTASSIUM SERPL-SCNC: 4.5 MMOL/L (ref 3.5–5)
RBC # BLD: 5.19 M/UL (ref 3.8–5.8)
SODIUM BLD-SCNC: 139 MMOL/L (ref 132–146)
TOTAL PROTEIN: 6.9 G/DL (ref 6.4–8.3)
TRIGL SERPL-MCNC: 199 MG/DL
VLDLC SERPL CALC-MCNC: 40 MG/DL
WBC # BLD: 12.1 K/UL (ref 4.5–11.5)

## 2025-04-11 PROCEDURE — 36415 COLL VENOUS BLD VENIPUNCTURE: CPT | Performed by: FAMILY MEDICINE

## 2025-04-11 PROCEDURE — 3079F DIAST BP 80-89 MM HG: CPT | Performed by: FAMILY MEDICINE

## 2025-04-11 PROCEDURE — 3075F SYST BP GE 130 - 139MM HG: CPT | Performed by: FAMILY MEDICINE

## 2025-04-11 PROCEDURE — 99396 PREV VISIT EST AGE 40-64: CPT | Performed by: FAMILY MEDICINE

## 2025-04-11 RX ORDER — BUPROPION HYDROCHLORIDE 150 MG/1
TABLET ORAL
Qty: 90 TABLET | Refills: 2 | Status: SHIPPED | OUTPATIENT
Start: 2025-04-11

## 2025-04-11 SDOH — ECONOMIC STABILITY: FOOD INSECURITY: WITHIN THE PAST 12 MONTHS, YOU WORRIED THAT YOUR FOOD WOULD RUN OUT BEFORE YOU GOT MONEY TO BUY MORE.: NEVER TRUE

## 2025-04-11 SDOH — ECONOMIC STABILITY: FOOD INSECURITY: WITHIN THE PAST 12 MONTHS, THE FOOD YOU BOUGHT JUST DIDN'T LAST AND YOU DIDN'T HAVE MONEY TO GET MORE.: NEVER TRUE

## 2025-04-11 ASSESSMENT — PATIENT HEALTH QUESTIONNAIRE - PHQ9
4. FEELING TIRED OR HAVING LITTLE ENERGY: NOT AT ALL
9. THOUGHTS THAT YOU WOULD BE BETTER OFF DEAD, OR OF HURTING YOURSELF: NOT AT ALL
SUM OF ALL RESPONSES TO PHQ QUESTIONS 1-9: 0
6. FEELING BAD ABOUT YOURSELF - OR THAT YOU ARE A FAILURE OR HAVE LET YOURSELF OR YOUR FAMILY DOWN: NOT AT ALL
8. MOVING OR SPEAKING SO SLOWLY THAT OTHER PEOPLE COULD HAVE NOTICED. OR THE OPPOSITE, BEING SO FIGETY OR RESTLESS THAT YOU HAVE BEEN MOVING AROUND A LOT MORE THAN USUAL: NOT AT ALL
2. FEELING DOWN, DEPRESSED OR HOPELESS: NOT AT ALL
7. TROUBLE CONCENTRATING ON THINGS, SUCH AS READING THE NEWSPAPER OR WATCHING TELEVISION: NOT AT ALL
5. POOR APPETITE OR OVEREATING: NOT AT ALL
SUM OF ALL RESPONSES TO PHQ QUESTIONS 1-9: 0
1. LITTLE INTEREST OR PLEASURE IN DOING THINGS: NOT AT ALL
3. TROUBLE FALLING OR STAYING ASLEEP: NOT AT ALL
10. IF YOU CHECKED OFF ANY PROBLEMS, HOW DIFFICULT HAVE THESE PROBLEMS MADE IT FOR YOU TO DO YOUR WORK, TAKE CARE OF THINGS AT HOME, OR GET ALONG WITH OTHER PEOPLE: NOT DIFFICULT AT ALL

## 2025-04-11 NOTE — PROGRESS NOTES
The 10-year ASCVD risk score (Cora CASTANEDA, et al., 2019) is: 23.7%    Values used to calculate the score:      Age: 64 years      Sex: Male      Is Non- : No      Diabetic: No      Tobacco smoker: Yes      Systolic Blood Pressure: 134 mmHg      Is BP treated: No      HDL Cholesterol: 46 mg/dL      Total Cholesterol: 281 mg/dL     Orders:    Lipid Panel      No follow-ups on file.    Subjective       Mood good. No excessive anxiety or stress.  Eating healthy diet and trying to exercise regularly, very active.   BMI 32.28  Social History    Tobacco Use      Smoking status: Every Day        Packs/day: 1.00        Years: 1 pack/day for 52.4 years (52.4 ttl pk-yrs)        Types: Cigarettes        Start date: 11/30/1972      Smokeless tobacco: Never    Alcohol use: Yes      Comment: social    Drug use: Yes      Types: Marijuana (Weed), Opiates , Cocaine, Other-see comments      Comment: h/o heroine, cocaine, opiates, marijuana     Not current on dental exam  No high risk sexual behavior  Wears seat belt. Any guns in home are stored safely. No environmental or occupational risks.    HIV screen Never done  Hepatitis C screen Never done  DTaP/Tdap/Td vaccine(1 - Tdap) Never done  Pneumococcal 50+ years Vaccine(1 of 2 - PCV) Never done  Colorectal Cancer Screen Never done  Shingles vaccine(1 of 2) Never done  Lung Cancer Screening &/or Counseling due on 01/05/2018  Prostate Specific Antigen (PSA) Screening or Monitoring due on 09/21/2022  COVID-19 Vaccine(4 - 2024-25 season) due on 09/01/2024       Review of Systems   Constitutional:  Negative for activity change, appetite change, chills, diaphoresis, fatigue and fever.   Eyes:  Negative for redness and visual disturbance.   Respiratory:  Negative for cough, chest tightness, shortness of breath and wheezing.    Cardiovascular:  Negative for chest pain, palpitations and leg swelling.   Gastrointestinal:  Negative for abdominal pain, blood in stool,

## 2025-04-12 LAB — HBA1C MFR BLD: 5.4 % (ref 4–5.6)

## 2025-04-17 ENCOUNTER — RESULTS FOLLOW-UP (OUTPATIENT)
Dept: PRIMARY CARE CLINIC | Age: 65
End: 2025-04-17

## 2025-04-17 PROBLEM — F32.5 MAJOR DEPRESSIVE DISORDER WITH SINGLE EPISODE, IN FULL REMISSION: Status: ACTIVE | Noted: 2025-04-17

## 2025-04-17 PROBLEM — F32.1 CURRENT MODERATE EPISODE OF MAJOR DEPRESSIVE DISORDER WITHOUT PRIOR EPISODE (HCC): Status: ACTIVE | Noted: 2025-04-17

## 2025-04-17 ASSESSMENT — ENCOUNTER SYMPTOMS
CHEST TIGHTNESS: 0
DIARRHEA: 0
CONSTIPATION: 0
BLOOD IN STOOL: 0
SHORTNESS OF BREATH: 0
WHEEZING: 0
EYE REDNESS: 0
COUGH: 0
ABDOMINAL PAIN: 0
VOMITING: 0
COLOR CHANGE: 0

## 2025-04-17 NOTE — RESULT ENCOUNTER NOTE
Cholesterol is quite high, , statin is recommended at this level - he previously took lipitor and crestor but stopped both (unsure why?). Is he willing to restart? He is high risk for CV events (MI/CVA)    The 10-year ASCVD risk score (Cora CASTANEDA, et al., 2019) is: 23.7%

## 2025-04-17 NOTE — ASSESSMENT & PLAN NOTE
Currently OK. Would like to restart medication as it did help with mood and potentially appetite suppression - has been gaining.     Orders:    buPROPion (WELLBUTRIN XL) 150 MG extended release tablet; TAKE ONE TABLET BY MOUTH IN THE MORNING        4/11/2025     3:31 PM 11/22/2022     1:52 PM 8/31/2021     9:46 AM 3/6/2019     2:49 PM 7/5/2017    11:18 AM   PHQ Scores   PHQ2 Score 0 6 6 0 0   PHQ9 Score 0 20 27 0 0     Interpretation of Total Score Depression Severity: 1-4 = Minimal depression, 5-9 = Mild depression, 10-14 = Moderate depression, 15-19 = Moderately severe depression, 20-27 = Severe depression

## 2025-04-18 RX ORDER — ROSUVASTATIN CALCIUM 10 MG/1
10 TABLET, COATED ORAL DAILY
Qty: 90 TABLET | Refills: 1 | Status: SHIPPED | OUTPATIENT
Start: 2025-04-18

## 2025-05-15 ENCOUNTER — OFFICE VISIT (OUTPATIENT)
Dept: ORTHOPEDIC SURGERY | Age: 65
End: 2025-05-15

## 2025-05-15 VITALS
DIASTOLIC BLOOD PRESSURE: 74 MMHG | OXYGEN SATURATION: 98 % | TEMPERATURE: 98.5 F | SYSTOLIC BLOOD PRESSURE: 131 MMHG | RESPIRATION RATE: 16 BRPM | HEART RATE: 84 BPM

## 2025-05-15 DIAGNOSIS — M75.81 ROTATOR CUFF TENDINITIS, RIGHT: ICD-10-CM

## 2025-05-15 DIAGNOSIS — M75.82 TENDINITIS OF LEFT ROTATOR CUFF: ICD-10-CM

## 2025-05-15 DIAGNOSIS — M25.512 BILATERAL SHOULDER PAIN, UNSPECIFIED CHRONICITY: Primary | ICD-10-CM

## 2025-05-15 DIAGNOSIS — M25.511 BILATERAL SHOULDER PAIN, UNSPECIFIED CHRONICITY: Primary | ICD-10-CM

## 2025-05-15 RX ORDER — TRIAMCINOLONE ACETONIDE 40 MG/ML
40 INJECTION, SUSPENSION INTRA-ARTICULAR; INTRAMUSCULAR ONCE
Status: COMPLETED | OUTPATIENT
Start: 2025-05-15 | End: 2025-05-15

## 2025-05-15 RX ORDER — BUPIVACAINE HYDROCHLORIDE 2.5 MG/ML
2 INJECTION, SOLUTION INFILTRATION; PERINEURAL ONCE
Status: COMPLETED | OUTPATIENT
Start: 2025-05-15 | End: 2025-05-15

## 2025-05-15 RX ADMIN — BUPIVACAINE HYDROCHLORIDE 5 MG: 2.5 INJECTION, SOLUTION INFILTRATION; PERINEURAL at 12:53

## 2025-05-15 RX ADMIN — TRIAMCINOLONE ACETONIDE 40 MG: 40 INJECTION, SUSPENSION INTRA-ARTICULAR; INTRAMUSCULAR at 12:53

## 2025-05-15 NOTE — PROGRESS NOTES
CHIEF COMPLAINT:   Chief Complaint   Patient presents with    Follow-up     Bl shoulder pain, he wants injections today, last csi 02/01/24      HPI update 5/15/2025: Giuliano is here today for follow-up of bilateral shoulders.  He received injection back in February 2024 and states this provided him good relief.  He denies any injury since previous visit.  Denies any numbness and tingling.  Denies any fevers and chills.  He would like to repeat these again today.     HPI update 2/1/2024: He is here today for repeat shoulder injections.  His last was in September.  His biceps tendon pain after proximal rupture has resolved.  He is having impingement type pain with overhead activity.  He responded well to his last set of injections.  Denies any recent changes.  Denies fever or chills.  Denies numbness tingling.    HPI update 9/21/2023: He was feeling very well after his steroid injections.  On Tuesday he felt a pop in his left shoulder and now has a deformity in his biceps.  This was very painful for 1 day but this is settled back down and is currently not having any pain.  He is happy with his injections and he feels much better today.    HPI:      Giuliano Whaley is a 64 y.o. year old male with chronic bilateral shoulder pain.  This has been going on since 1989.  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  For the past years he had received subacromial steroid injection by his family doctor with good relief with the most recent one over a year ago did not seem to help.  He has tried some home exercise without relief.  He has chronic shoulder pain.  On the left it is mostly anterior along his biceps groove.  On the right it is with overhead activity.  He is right-hand dominant.  Denies any recent injuries.  Denies fevers or chills.  Denies numbness tingling.    PAST MEDICAL HISTORY  History reviewed. No pertinent past medical history.    PAST SURGICAL HISTORY  Past

## 2025-05-16 LAB — PSA SERPL-MCNC: 4.81 NG/ML (ref 0–4)

## 2025-07-07 ENCOUNTER — OFFICE VISIT (OUTPATIENT)
Dept: PRIMARY CARE CLINIC | Age: 65
End: 2025-07-07
Payer: MEDICAID

## 2025-07-07 VITALS
HEART RATE: 65 BPM | SYSTOLIC BLOOD PRESSURE: 128 MMHG | OXYGEN SATURATION: 98 % | BODY MASS INDEX: 32.29 KG/M2 | HEIGHT: 69 IN | DIASTOLIC BLOOD PRESSURE: 71 MMHG | WEIGHT: 218 LBS | TEMPERATURE: 97.6 F | RESPIRATION RATE: 16 BRPM

## 2025-07-07 DIAGNOSIS — B02.9 HERPES ZOSTER WITHOUT COMPLICATION: Primary | ICD-10-CM

## 2025-07-07 PROCEDURE — 4004F PT TOBACCO SCREEN RCVD TLK: CPT | Performed by: NURSE PRACTITIONER

## 2025-07-07 PROCEDURE — 3078F DIAST BP <80 MM HG: CPT | Performed by: NURSE PRACTITIONER

## 2025-07-07 PROCEDURE — G8417 CALC BMI ABV UP PARAM F/U: HCPCS | Performed by: NURSE PRACTITIONER

## 2025-07-07 PROCEDURE — G8427 DOCREV CUR MEDS BY ELIG CLIN: HCPCS | Performed by: NURSE PRACTITIONER

## 2025-07-07 PROCEDURE — 99213 OFFICE O/P EST LOW 20 MIN: CPT | Performed by: NURSE PRACTITIONER

## 2025-07-07 PROCEDURE — 3074F SYST BP LT 130 MM HG: CPT | Performed by: NURSE PRACTITIONER

## 2025-07-07 PROCEDURE — 3017F COLORECTAL CA SCREEN DOC REV: CPT | Performed by: NURSE PRACTITIONER

## 2025-07-07 RX ORDER — VALACYCLOVIR HYDROCHLORIDE 1 G/1
1000 TABLET, FILM COATED ORAL 3 TIMES DAILY
Qty: 21 TABLET | Refills: 0 | Status: SHIPPED | OUTPATIENT
Start: 2025-07-07 | End: 2025-07-14

## 2025-07-07 NOTE — PROGRESS NOTES
Chief Complaint:   Leg Pain (Lt leg pain radiating down to Knee and sores on Lt leg x2 weeks.)    History of Present Illness   HPI:  Giuliano Whaley is a 64 y.o. male who [facility] for left posterior thigh pain for the last 2 weeks. Pain is between the buttock and the knee. Also has some sores noted to the left posterior thigh and left buttock. Has been taking Ibuprofen for the pain with good relief, but states he's taking too many. Reports he was having chills and sweats over night last night, but felt fine this morning. Has been taking Cratum for the symptoms with good relief. No recent long travel. No hormone replacement therapy. No known family of clotting issues. No drainage from sores.     Prior to Visit Medications    Medication Sig Taking? Authorizing Provider   valACYclovir (VALTREX) 1 g tablet Take 1 tablet by mouth 3 times daily for 7 days Yes Gordy Gutiérrez APRN - CNP   rosuvastatin (CRESTOR) 10 MG tablet Take 1 tablet by mouth daily Yes Giuliano Bucio MD   buPROPion (WELLBUTRIN XL) 150 MG extended release tablet TAKE ONE TABLET BY MOUTH IN THE MORNING Yes Giuliano Bucio MD   tamsulosin (FLOMAX) 0.4 MG capsule Take 1 capsule by mouth daily Taking two at bedtime since starting Wellbutrin per Urologist Yes Provider, MD Mauri       Review of Systems   Unless otherwise stated in this report or unable to obtain because of the patient's clinical or mental status as evidenced by the medical record, this patients's positive and negative responses for Review of Systems, constitutional, psych, eyes, ENT, cardiovascular, respiratory, gastrointestinal, neurological, genitourinary, musculoskeletal, integument systems and systems related to the presenting problem are either stated in the preceding or were not pertinent or were negative for the symptoms and/or complaints related to the medical problem.    Past Medical History:  has no past medical history on file.   Past Surgical

## (undated) DEVICE — DOUBLE BASIN SET: Brand: MEDLINE INDUSTRIES, INC.

## (undated) DEVICE — ELECTRODE PT RET AD L9FT HI MOIST COND ADH HYDRGEL CORDED

## (undated) DEVICE — STRAP POS MP 30X3 IN HK LOOP CLOSURE FOAM DISP

## (undated) DEVICE — SURGICAL PROCEDURE PACK HND

## (undated) DEVICE — ZIMMER® STERILE DISPOSABLE TOURNIQUET CUFF WITH PROTECTIVE SLEEVE AND PLC, DUAL PORT, SINGLE BLADDER, 18 IN. (46 CM)

## (undated) DEVICE — NEPTUNE E-SEP SMOKE EVACUATION PENCIL, COATED, 70MM BLADE, PUSH BUTTON SWITCH: Brand: NEPTUNE E-SEP

## (undated) DEVICE — 4-PORT MANIFOLD: Brand: NEPTUNE 2